# Patient Record
Sex: MALE | Race: WHITE | NOT HISPANIC OR LATINO | Employment: FULL TIME | URBAN - METROPOLITAN AREA
[De-identification: names, ages, dates, MRNs, and addresses within clinical notes are randomized per-mention and may not be internally consistent; named-entity substitution may affect disease eponyms.]

---

## 2019-08-14 DIAGNOSIS — Z00.00 ROUTINE GENERAL MEDICAL EXAMINATION AT A HEALTH CARE FACILITY: Primary | ICD-10-CM

## 2019-10-24 ENCOUNTER — CLINICAL SUPPORT (OUTPATIENT)
Dept: INTERNAL MEDICINE | Facility: CLINIC | Age: 34
End: 2019-10-24
Attending: INTERNAL MEDICINE
Payer: COMMERCIAL

## 2019-10-24 ENCOUNTER — OFFICE VISIT (OUTPATIENT)
Dept: INFECTIOUS DISEASES | Facility: CLINIC | Age: 34
End: 2019-10-24
Payer: COMMERCIAL

## 2019-10-24 ENCOUNTER — HOSPITAL ENCOUNTER (OUTPATIENT)
Dept: RADIOLOGY | Facility: HOSPITAL | Age: 34
Discharge: HOME | End: 2019-10-24
Attending: INTERNAL MEDICINE
Payer: COMMERCIAL

## 2019-10-24 ENCOUNTER — CLINICAL SUPPORT (OUTPATIENT)
Dept: INFECTIOUS DISEASES | Facility: CLINIC | Age: 34
End: 2019-10-24
Payer: COMMERCIAL

## 2019-10-24 ENCOUNTER — HOSPITAL ENCOUNTER (OUTPATIENT)
Dept: CARDIOLOGY | Facility: CLINIC | Age: 34
Discharge: HOME | End: 2019-10-24
Payer: COMMERCIAL

## 2019-10-24 VITALS
DIASTOLIC BLOOD PRESSURE: 76 MMHG | HEART RATE: 69 BPM | HEIGHT: 71 IN | WEIGHT: 199.94 LBS | SYSTOLIC BLOOD PRESSURE: 117 MMHG | TEMPERATURE: 98 F | BODY MASS INDEX: 27.99 KG/M2

## 2019-10-24 DIAGNOSIS — Z00.00 PREVENTATIVE HEALTH CARE: Primary | ICD-10-CM

## 2019-10-24 DIAGNOSIS — F40.240 CLAUSTROPHOBIA: ICD-10-CM

## 2019-10-24 DIAGNOSIS — Z00.00 ROUTINE GENERAL MEDICAL EXAMINATION AT A HEALTH CARE FACILITY: Primary | ICD-10-CM

## 2019-10-24 DIAGNOSIS — Z00.00 ROUTINE GENERAL MEDICAL EXAMINATION AT A HEALTH CARE FACILITY: ICD-10-CM

## 2019-10-24 DIAGNOSIS — E78.1 HYPERTRIGLYCERIDEMIA: ICD-10-CM

## 2019-10-24 DIAGNOSIS — Z00.00 PREVENTATIVE HEALTH CARE: ICD-10-CM

## 2019-10-24 LAB
ALBUMIN SERPL BCP-MCNC: 4.3 G/DL (ref 3.5–5.2)
ALP SERPL-CCNC: 81 U/L (ref 55–135)
ALT SERPL W/O P-5'-P-CCNC: 29 U/L (ref 10–44)
ANION GAP SERPL CALC-SCNC: 7 MMOL/L (ref 8–16)
AST SERPL-CCNC: 26 U/L (ref 10–40)
BILIRUB SERPL-MCNC: 0.6 MG/DL (ref 0.1–1)
BUN SERPL-MCNC: 17 MG/DL (ref 6–20)
CALCIUM SERPL-MCNC: 9.6 MG/DL (ref 8.7–10.5)
CHLORIDE SERPL-SCNC: 106 MMOL/L (ref 95–110)
CHOLEST SERPL-MCNC: 213 MG/DL (ref 120–199)
CHOLEST/HDLC SERPL: 7.3 {RATIO} (ref 2–5)
CO2 SERPL-SCNC: 30 MMOL/L (ref 23–29)
CREAT SERPL-MCNC: 1.1 MG/DL (ref 0.5–1.4)
ERYTHROCYTE [DISTWIDTH] IN BLOOD BY AUTOMATED COUNT: 12.3 % (ref 11.5–14.5)
EST. GFR  (AFRICAN AMERICAN): >60 ML/MIN/1.73 M^2
EST. GFR  (NON AFRICAN AMERICAN): >60 ML/MIN/1.73 M^2
ESTIMATED AVG GLUCOSE: 103 MG/DL (ref 68–131)
GLUCOSE SERPL-MCNC: 100 MG/DL (ref 70–110)
HBA1C MFR BLD HPLC: 5.2 % (ref 4–5.6)
HCT VFR BLD AUTO: 49.2 % (ref 40–54)
HDLC SERPL-MCNC: 29 MG/DL (ref 40–75)
HDLC SERPL: 13.6 % (ref 20–50)
HGB BLD-MCNC: 16.8 G/DL (ref 14–18)
HIV 1+2 AB+HIV1 P24 AG SERPL QL IA: NEGATIVE
LDLC SERPL CALC-MCNC: 137.6 MG/DL (ref 63–159)
MCH RBC QN AUTO: 30.7 PG (ref 27–31)
MCHC RBC AUTO-ENTMCNC: 34.1 G/DL (ref 32–36)
MCV RBC AUTO: 90 FL (ref 82–98)
NONHDLC SERPL-MCNC: 184 MG/DL
PLATELET # BLD AUTO: 257 K/UL (ref 150–350)
PMV BLD AUTO: 9.3 FL (ref 9.2–12.9)
POTASSIUM SERPL-SCNC: 4.6 MMOL/L (ref 3.5–5.1)
PROT SERPL-MCNC: 7.8 G/DL (ref 6–8.4)
RBC # BLD AUTO: 5.48 M/UL (ref 4.6–6.2)
SODIUM SERPL-SCNC: 143 MMOL/L (ref 136–145)
TRIGL SERPL-MCNC: 232 MG/DL (ref 30–150)
WBC # BLD AUTO: 8.12 K/UL (ref 3.9–12.7)

## 2019-10-24 PROCEDURE — 71046 XR CHEST PA AND LATERAL: ICD-10-PCS | Mod: 26,,, | Performed by: RADIOLOGY

## 2019-10-24 PROCEDURE — 85027 COMPLETE CBC AUTOMATED: CPT

## 2019-10-24 PROCEDURE — 93005 EKG 12-LEAD: ICD-10-PCS | Mod: ,,, | Performed by: INTERNAL MEDICINE

## 2019-10-24 PROCEDURE — 97802 PR MED NUTR THER, 1ST, INDIV, EA 15 MIN: ICD-10-PCS | Mod: S$GLB,,, | Performed by: INTERNAL MEDICINE

## 2019-10-24 PROCEDURE — 93010 EKG 12-LEAD: ICD-10-PCS | Mod: ,,, | Performed by: INTERNAL MEDICINE

## 2019-10-24 PROCEDURE — 36415 COLL VENOUS BLD VENIPUNCTURE: CPT

## 2019-10-24 PROCEDURE — 90471 FLU VACCINE (QUAD) GREATER THAN OR EQUAL TO 3YO PRESERVATIVE FREE IM: ICD-10-PCS | Mod: S$GLB,,, | Performed by: INTERNAL MEDICINE

## 2019-10-24 PROCEDURE — 86703 HIV-1/HIV-2 1 RESULT ANTBDY: CPT

## 2019-10-24 PROCEDURE — 90686 FLU VACCINE (QUAD) GREATER THAN OR EQUAL TO 3YO PRESERVATIVE FREE IM: ICD-10-PCS | Mod: S$GLB,,, | Performed by: INTERNAL MEDICINE

## 2019-10-24 PROCEDURE — 80053 COMPREHEN METABOLIC PANEL: CPT

## 2019-10-24 PROCEDURE — 80061 LIPID PANEL: CPT

## 2019-10-24 PROCEDURE — 99385 PREV VISIT NEW AGE 18-39: CPT | Mod: S$PBB,,, | Performed by: INTERNAL MEDICINE

## 2019-10-24 PROCEDURE — 99385 PR PREVENTIVE VISIT,NEW,18-39: ICD-10-PCS | Mod: S$PBB,,, | Performed by: INTERNAL MEDICINE

## 2019-10-24 PROCEDURE — 90686 IIV4 VACC NO PRSV 0.5 ML IM: CPT | Mod: S$GLB,,, | Performed by: INTERNAL MEDICINE

## 2019-10-24 PROCEDURE — 93010 ELECTROCARDIOGRAM REPORT: CPT | Mod: ,,, | Performed by: INTERNAL MEDICINE

## 2019-10-24 PROCEDURE — 83036 HEMOGLOBIN GLYCOSYLATED A1C: CPT

## 2019-10-24 PROCEDURE — 97802 MEDICAL NUTRITION INDIV IN: CPT | Mod: S$GLB,,, | Performed by: INTERNAL MEDICINE

## 2019-10-24 PROCEDURE — 93005 ELECTROCARDIOGRAM TRACING: CPT | Mod: ,,, | Performed by: INTERNAL MEDICINE

## 2019-10-24 PROCEDURE — 90471 IMMUNIZATION ADMIN: CPT | Mod: S$GLB,,, | Performed by: INTERNAL MEDICINE

## 2019-10-24 PROCEDURE — 71046 X-RAY EXAM CHEST 2 VIEWS: CPT | Mod: 26,,, | Performed by: RADIOLOGY

## 2019-10-24 PROCEDURE — 71046 X-RAY EXAM CHEST 2 VIEWS: CPT | Mod: TC,FY

## 2019-10-24 RX ORDER — LORATADINE 10 MG/1
10 TABLET ORAL DAILY
COMMUNITY

## 2019-10-24 RX ORDER — AZITHROMYCIN 250 MG/1
500 TABLET, FILM COATED ORAL DAILY
COMMUNITY
End: 2020-12-17

## 2019-10-24 RX ORDER — MONTELUKAST SODIUM 10 MG/1
10 TABLET ORAL NIGHTLY
COMMUNITY
End: 2024-02-01

## 2019-10-24 NOTE — PROGRESS NOTES
Subjective:      Patient ID: Daron Lea is a 34 y.o. male.    Chief Complaint:   Executive Health exam for this  of [facility] Taylor. He lives in [place] and is  with two daughters ages 4 and 15 months.       History of Present Illness  He has enjoyed good health in the past. He has been getting PT for a right shoulder injury and may be having surgery on this in near future. He is about 15 lb over his ideal body weight and does not get any regular exercise. Only complaint was of some claustraphobia which he requested some counseling for.    The laboratory studies were reviewed and discussed with the pt:  CBC, comprehensive metabolic profile, hemoglobin A1c, HIV antibody, EKG and chest xray were all normal.    Lipid profile shows a mild elevation of total cholesterol and triglycerides and a low HDL cholesterol  Lab Results   Component Value Date    CHOL 213 (H) 10/24/2019     Lab Results   Component Value Date    HDL 29 (L) 10/24/2019     Lab Results   Component Value Date    LDLCALC 137.6 10/24/2019     Lab Results   Component Value Date    TRIG 232 (H) 10/24/2019     Lab Results   Component Value Date    CHOLHDL 13.6 (L) 10/24/2019         Review of Systems   Constitution: Positive for malaise/fatigue. Negative for chills, decreased appetite, fever, night sweats, weight gain and weight loss.   HENT: Positive for sore throat. Negative for congestion, ear pain, hearing loss, hoarse voice and tinnitus.    Eyes: Negative for blurred vision, redness and visual disturbance.   Cardiovascular: Negative for chest pain, leg swelling and palpitations.   Respiratory: Negative for cough, hemoptysis, shortness of breath and sputum production.    Hematologic/Lymphatic: Negative for adenopathy. Does not bruise/bleed easily.   Skin: Negative for dry skin, itching, rash and suspicious lesions.   Musculoskeletal: Negative for back pain, joint pain, myalgias and neck pain.   Gastrointestinal: Positive for vomiting.  Negative for abdominal pain, constipation, diarrhea, heartburn and nausea.   Genitourinary: Negative for dysuria, flank pain, frequency, hematuria, hesitancy and urgency.   Neurological: Negative for dizziness, headaches, numbness, paresthesias and weakness.   Psychiatric/Behavioral: Negative for depression and memory loss. The patient does not have insomnia and is not nervous/anxious.      Objective:   Physical Exam   Constitutional: He is oriented to person, place, and time. He appears well-developed and well-nourished.   HENT:   Head: Normocephalic and atraumatic.   Right Ear: External ear normal.   Left Ear: External ear normal.   Mouth/Throat: Oropharynx is clear and moist.   Eyes: Pupils are equal, round, and reactive to light. Conjunctivae and EOM are normal.   Neck: Normal range of motion. Neck supple. No thyromegaly present.   Cardiovascular: Normal rate, regular rhythm and normal heart sounds.   No murmur heard.  Pulmonary/Chest: Effort normal and breath sounds normal. He has no wheezes. He has no rales.   Abdominal: Soft. Bowel sounds are normal. He exhibits no mass. There is no tenderness. There is no rebound.   Genitourinary:   Genitourinary Comments: Normal genitalia, no hernias present   Musculoskeletal: Normal range of motion.   Lymphadenopathy:     He has no cervical adenopathy.   Neurological: He is alert and oriented to person, place, and time.   Skin: Skin is warm and dry.   Psychiatric: He has a normal mood and affect. His behavior is normal.   Vitals reviewed.    Assessment:       1. Preventative health care    2. Claustrophobia    3. Hypertriglyceridemia     4.     Overweight by 15 lb     Plan:        1. Increase exercise level and decrease caloric intake to lose weight and improve lipid level   2. Psychology consult re claustraphobia    3. Flu shot

## 2019-10-24 NOTE — PROGRESS NOTES
"Nutrition Assessment  Client name:  Daron Lea  :  1985  Age:  34 y.o.  Gender:  male    Client states he is here for his first Executive Health medical examination. He lives in [place], [place] but will travel for work to [place]. 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 He currently admits to taking Claritin and a sinus medication for seasonal allergies. He also states he has soreness in his right shoulder from recent surgery, tore right shoulder labrum playing with his kids. He originally noticed the pain 2.5 years ago, started seeing a chiropractor, and then finally moved to seeing an orthopedic surgeon. Mr. Lea is a father of two: 4 year old and a 16 month old. 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 Other two days of the week sitting in a desk all day. Not a big breakfast rui. Typically will either skip breakfast or eat a bag of trail mix around 9:30am. Wife likes to cook healthy meals at home. He has a personal goal of 10, 000 steps a day and walks the dog every night. Does not like to run. This past  (10/20/2019) he threw up for 3 hours, but attributes the reason to daughter having had similar symptoms three days prior. Stayed home Monday and felt back to normal on Tuesday. He feels he may be allergic to crab/crawfish boil, which typically gives him soon after. His states his UBW is 199 pounds.    Anthropometrics  Height:  71 inches     Weight:  199 pounds  BMI:  27.8  % Body Fat:  N/A    Clinical Signs/Symptoms  N/V/D:  Vomitting last  for 3 hours r/t daughter had similar symptoms three days before. Feeling well today.  Appetite (Good, Fair, or Poor):  good      No past medical history on file.    No past surgical history on file.    Medications    Singulair & Claritin.    Vitamins, Minerals, and/or Supplements:  Green Vitamin (from Wife) "Plexus X-Factor"    Food/Medication Interactions:  Reviewed "     Food Allergies or Intolerances:  NKFA     Social History    Marital status:    Employment:  [employer]    Social History     Tobacco Use    Smoking status: Not on file   Substance Use Topics    Alcohol use: Not on file        Lab Reports   Total Cholesterol:  213    Triglycerides:  232  HDL:  29  LDL:  137.6   Glucose:  100  HbA1c:  5.2  BP:  117/76     Food History  Breakfast:  None / Trail Mix / Waffle / Strawberries and Cream Oatmeal + 2 cups of coffee w 1 tsp honey  Mid-morning Snack:  None  water  Lunch:  Restaurant foods (sandwich/ bread-jorge luis meals/ burgers/ wraps w fries / chips) + sweet tea  Mid-afternoon Snack:  Water  Dinner:  Red Beans and Rice / Lasagna / Steaks on the weekends / Kabobs / Mexican / Sushi / [employer] + Sweet Tea  H.S. Snack:  Late night bowl of cereal (Honey Bunches of Oats / Cinnamon Toast Crunch / Yong's Puff) 4 nights a week  *Fluid intake:  Coffee, Sweet Tea, Water, 3 drinks alcohol a week    Exercise History:  Mildly Active. Inconsistent 10,000 steps a day goal.    Cultural/Spiritual/Personal Preferences:  none    Support System:  Wife and Kids at home    State of Change:  contemplation    Barriers to Change:  none    Diagnosis    Altered nutrition-related lab values related to high consumption of carbohydrates and inactive lifestyle as evidenced by Trig 232, HDL 29.    Intervention    RMR (Method:  [place] Jeor):  1868 kcal  Activity Factor:  1.2  MANA:  2250  Protein Needs: (1.1g/kg) 100 g    Goals:  1.  Decrease intake of carbohydrates to ~60 g carbs / meal  2.  Increase physical activity to 10, 000 steps 6 days a week consistently  3.  Cut down Sweat Tea to half of current fl oz. intake    Nutrition Education  Educated patient on the importance of appropriate carbohydrates for energy. Recommended he limit his intake of simple sugars and focus on complex carbohydrates. Encouraged him to increase consistency of his physical activity for lipid panel improvements.  Gave him clear guidelines and suggestions for approaching commercially available foods and foods prepared outside of the home.  Patient verbalized understanding of nutrition education and recommendations received.    Handouts Provided  Meal Planning Guide  Restaurant Guide  Eat Fit Shopping List  Eat Fit Emily  Fast Food Guide  Vitamin/Mineral Guide    Monitoring/Evaluation    Monitor the following:  Weight  BMI  % Body Fat  Caloric intake  Labs:  LDL, Trig, TC    Follow Up Plan:  Communication with referring healthcare provider is unnecessary at this time as patient presented as part of annual wellness exam.  However, will follow up with patient in 1-2 years.

## 2020-01-10 ENCOUNTER — PATIENT MESSAGE (OUTPATIENT)
Dept: INFECTIOUS DISEASES | Facility: CLINIC | Age: 35
End: 2020-01-10

## 2020-01-21 ENCOUNTER — PATIENT MESSAGE (OUTPATIENT)
Dept: INFECTIOUS DISEASES | Facility: CLINIC | Age: 35
End: 2020-01-21

## 2020-12-17 ENCOUNTER — CLINICAL SUPPORT (OUTPATIENT)
Dept: INTERNAL MEDICINE | Facility: CLINIC | Age: 35
End: 2020-12-17

## 2020-12-17 ENCOUNTER — OFFICE VISIT (OUTPATIENT)
Dept: INTERNAL MEDICINE | Facility: CLINIC | Age: 35
End: 2020-12-17
Payer: COMMERCIAL

## 2020-12-17 ENCOUNTER — HOSPITAL ENCOUNTER (OUTPATIENT)
Dept: RADIOLOGY | Facility: HOSPITAL | Age: 35
Discharge: HOME | End: 2020-12-17
Attending: INTERNAL MEDICINE

## 2020-12-17 ENCOUNTER — CLINICAL SUPPORT (OUTPATIENT)
Dept: INTERNAL MEDICINE | Facility: CLINIC | Age: 35
End: 2020-12-17
Payer: COMMERCIAL

## 2020-12-17 ENCOUNTER — HOSPITAL ENCOUNTER (OUTPATIENT)
Dept: CARDIOLOGY | Facility: CLINIC | Age: 35
Discharge: HOME | End: 2020-12-17

## 2020-12-17 VITALS
WEIGHT: 207.5 LBS | BODY MASS INDEX: 29.05 KG/M2 | SYSTOLIC BLOOD PRESSURE: 109 MMHG | DIASTOLIC BLOOD PRESSURE: 75 MMHG | TEMPERATURE: 98 F | HEART RATE: 77 BPM | HEIGHT: 71 IN

## 2020-12-17 DIAGNOSIS — Z00.00 ROUTINE GENERAL MEDICAL EXAMINATION AT A HEALTH CARE FACILITY: ICD-10-CM

## 2020-12-17 DIAGNOSIS — Z00.00 ROUTINE GENERAL MEDICAL EXAMINATION AT A HEALTH CARE FACILITY: Primary | ICD-10-CM

## 2020-12-17 DIAGNOSIS — Z00.00 ANNUAL PHYSICAL EXAM: Primary | ICD-10-CM

## 2020-12-17 DIAGNOSIS — Z00.00 ENCOUNTER FOR ANNUAL HEALTH EXAMINATION: Primary | ICD-10-CM

## 2020-12-17 LAB
ALBUMIN SERPL BCP-MCNC: 4.1 G/DL (ref 3.5–5.2)
ALP SERPL-CCNC: 83 U/L (ref 55–135)
ALT SERPL W/O P-5'-P-CCNC: 26 U/L (ref 10–44)
ANION GAP SERPL CALC-SCNC: 7 MMOL/L (ref 8–16)
AST SERPL-CCNC: 15 U/L (ref 10–40)
BILIRUB SERPL-MCNC: 0.9 MG/DL (ref 0.1–1)
BUN SERPL-MCNC: 18 MG/DL (ref 6–20)
CALCIUM SERPL-MCNC: 9.1 MG/DL (ref 8.7–10.5)
CHLORIDE SERPL-SCNC: 104 MMOL/L (ref 95–110)
CHOLEST SERPL-MCNC: 227 MG/DL (ref 120–199)
CHOLEST/HDLC SERPL: 7.1 {RATIO} (ref 2–5)
CO2 SERPL-SCNC: 29 MMOL/L (ref 23–29)
CREAT SERPL-MCNC: 1 MG/DL (ref 0.5–1.4)
ERYTHROCYTE [DISTWIDTH] IN BLOOD BY AUTOMATED COUNT: 12.3 % (ref 11.5–14.5)
EST. GFR  (AFRICAN AMERICAN): >60 ML/MIN/1.73 M^2
EST. GFR  (NON AFRICAN AMERICAN): >60 ML/MIN/1.73 M^2
ESTIMATED AVG GLUCOSE: 111 MG/DL (ref 68–131)
GLUCOSE SERPL-MCNC: 100 MG/DL (ref 70–110)
HBA1C MFR BLD HPLC: 5.5 % (ref 4–5.6)
HCT VFR BLD AUTO: 47 % (ref 40–54)
HCV AB SERPL QL IA: NEGATIVE
HDLC SERPL-MCNC: 32 MG/DL (ref 40–75)
HDLC SERPL: 14.1 % (ref 20–50)
HGB BLD-MCNC: 15.8 G/DL (ref 14–18)
LDLC SERPL CALC-MCNC: 151 MG/DL (ref 63–159)
MCH RBC QN AUTO: 30 PG (ref 27–31)
MCHC RBC AUTO-ENTMCNC: 33.6 G/DL (ref 32–36)
MCV RBC AUTO: 89 FL (ref 82–98)
NONHDLC SERPL-MCNC: 195 MG/DL
PLATELET # BLD AUTO: 243 K/UL (ref 150–350)
PMV BLD AUTO: 9.4 FL (ref 9.2–12.9)
POTASSIUM SERPL-SCNC: 4 MMOL/L (ref 3.5–5.1)
PROT SERPL-MCNC: 7.7 G/DL (ref 6–8.4)
RBC # BLD AUTO: 5.26 M/UL (ref 4.6–6.2)
SARS-COV-2 IGG SERPLBLD QL IA.RAPID: NEGATIVE
SODIUM SERPL-SCNC: 140 MMOL/L (ref 136–145)
TRIGL SERPL-MCNC: 220 MG/DL (ref 30–150)
WBC # BLD AUTO: 6.69 K/UL (ref 3.9–12.7)

## 2020-12-17 PROCEDURE — 99999 PR PBB SHADOW E&M-EST. PATIENT-LVL III: CPT | Mod: PBBFAC,,, | Performed by: INTERNAL MEDICINE

## 2020-12-17 PROCEDURE — 99395 PREV VISIT EST AGE 18-39: CPT | Mod: S$GLB,,, | Performed by: INTERNAL MEDICINE

## 2020-12-17 PROCEDURE — 80061 LIPID PANEL: CPT

## 2020-12-17 PROCEDURE — 93005 EKG 12-LEAD: ICD-10-PCS | Mod: S$GLB,,, | Performed by: INTERNAL MEDICINE

## 2020-12-17 PROCEDURE — 86769 SARS-COV-2 COVID-19 ANTIBODY: CPT

## 2020-12-17 PROCEDURE — 99999 PR PBB SHADOW E&M-EST. PATIENT-LVL I: CPT | Mod: PBBFAC,,,

## 2020-12-17 PROCEDURE — 1126F PR PAIN SEVERITY QUANTIFIED, NO PAIN PRESENT: ICD-10-PCS | Mod: S$GLB,,, | Performed by: INTERNAL MEDICINE

## 2020-12-17 PROCEDURE — 97802 PR MED NUTR THER, 1ST, INDIV, EA 15 MIN: ICD-10-PCS | Mod: S$GLB,,, | Performed by: INTERNAL MEDICINE

## 2020-12-17 PROCEDURE — 93005 ELECTROCARDIOGRAM TRACING: CPT | Mod: S$GLB,,, | Performed by: INTERNAL MEDICINE

## 2020-12-17 PROCEDURE — 93010 EKG 12-LEAD: ICD-10-PCS | Mod: S$GLB,,, | Performed by: INTERNAL MEDICINE

## 2020-12-17 PROCEDURE — 99395 PR PREVENTIVE VISIT,EST,18-39: ICD-10-PCS | Mod: S$GLB,,, | Performed by: INTERNAL MEDICINE

## 2020-12-17 PROCEDURE — 71046 XR CHEST PA AND LATERAL: ICD-10-PCS | Mod: 26,,, | Performed by: RADIOLOGY

## 2020-12-17 PROCEDURE — 71046 X-RAY EXAM CHEST 2 VIEWS: CPT | Mod: TC,FY

## 2020-12-17 PROCEDURE — 99999 PR PBB SHADOW E&M-EST. PATIENT-LVL I: ICD-10-PCS | Mod: PBBFAC,,,

## 2020-12-17 PROCEDURE — 3008F PR BODY MASS INDEX (BMI) DOCUMENTED: ICD-10-PCS | Mod: CPTII,S$GLB,, | Performed by: INTERNAL MEDICINE

## 2020-12-17 PROCEDURE — 97802 MEDICAL NUTRITION INDIV IN: CPT | Mod: S$GLB,,, | Performed by: INTERNAL MEDICINE

## 2020-12-17 PROCEDURE — 93010 ELECTROCARDIOGRAM REPORT: CPT | Mod: S$GLB,,, | Performed by: INTERNAL MEDICINE

## 2020-12-17 PROCEDURE — 99999 PR PBB SHADOW E&M-EST. PATIENT-LVL III: ICD-10-PCS | Mod: PBBFAC,,, | Performed by: INTERNAL MEDICINE

## 2020-12-17 PROCEDURE — 85027 COMPLETE CBC AUTOMATED: CPT

## 2020-12-17 PROCEDURE — 80053 COMPREHEN METABOLIC PANEL: CPT

## 2020-12-17 PROCEDURE — 71046 X-RAY EXAM CHEST 2 VIEWS: CPT | Mod: 26,,, | Performed by: RADIOLOGY

## 2020-12-17 PROCEDURE — 36415 COLL VENOUS BLD VENIPUNCTURE: CPT

## 2020-12-17 PROCEDURE — 1126F AMNT PAIN NOTED NONE PRSNT: CPT | Mod: S$GLB,,, | Performed by: INTERNAL MEDICINE

## 2020-12-17 PROCEDURE — 83036 HEMOGLOBIN GLYCOSYLATED A1C: CPT

## 2020-12-17 PROCEDURE — 97802 MEDICAL NUTRITION INDIV IN: CPT | Mod: PBBFAC | Performed by: INTERNAL MEDICINE

## 2020-12-17 PROCEDURE — 3008F BODY MASS INDEX DOCD: CPT | Mod: CPTII,S$GLB,, | Performed by: INTERNAL MEDICINE

## 2020-12-17 PROCEDURE — 86803 HEPATITIS C AB TEST: CPT

## 2020-12-17 RX ORDER — FLUTICASONE PROPIONATE 50 MCG
1 SPRAY, SUSPENSION (ML) NASAL 2 TIMES DAILY
Refills: 0
Start: 2020-12-17 | End: 2022-01-07 | Stop reason: SDUPTHER

## 2020-12-17 NOTE — LETTER
12/17/2020    Daron Lea  [address] [address]       [facility]  [address], [address]  [facility] [address]  Phone: [phone]  Fax: [phone] Dear Mr. Lea:    Thank you for allowing me to serve you and perform your Executive Health exam on 12/17/2020.  This letter will serve a brief summary of the history, findings and discussions at that time. I have included the lab and study results for you to have available at future healthcare appointments.      Reason for Visit: Executive Health Preventive Physical Examination      Past Medical History:  Past Medical History:   Diagnosis Date    Allergic rhinitis     Hyperlipidemia         Labs:  Comprehensive metabolic panel    Collection Time: 12/17/20  7:33 AM   Result Value Ref Range    Sodium 140 136 - 145 mmol/L    Potassium 4.0 3.5 - 5.1 mmol/L    Chloride 104 95 - 110 mmol/L    CO2 29 23 - 29 mmol/L    Glucose 100 70 - 110 mg/dL    BUN 18 6 - 20 mg/dL    Creatinine 1.0 0.5 - 1.4 mg/dL    Calcium 9.1 8.7 - 10.5 mg/dL    Total Protein 7.7 6.0 - 8.4 g/dL    Albumin 4.1 3.5 - 5.2 g/dL    Total Bilirubin 0.9 0.1 - 1.0 mg/dL    Alkaline Phosphatase 83 55 - 135 U/L    AST 15 10 - 40 U/L    ALT 26 10 - 44 U/L    Anion Gap 7 (L) 8 - 16 mmol/L    eGFR if African American >60.0 >60 mL/min/1.73 m^2    eGFR if non African American >60.0 >60 mL/min/1.73 m^2   CBC Without Differential    Collection Time: 12/17/20  7:33 AM   Result Value Ref Range    WBC 6.69 3.90 - 12.70 K/uL    RBC 5.26 4.60 - 6.20 M/uL    Hemoglobin 15.8 14.0 - 18.0 g/dL    Hematocrit 47.0 40.0 - 54.0 %    MCV 89 82 - 98 fL    MCH 30.0 27.0 - 31.0 pg    MCHC 33.6 32.0 - 36.0 g/dL    RDW 12.3 11.5 - 14.5 %    Platelets 243 150 - 350 K/uL    MPV 9.4 9.2 - 12.9 fL   Lipid panel    Collection Time: 12/17/20  7:33 AM   Result Value Ref Range    Cholesterol 227 (H) 120 - 199 mg/dL    Triglycerides 220 (H) 30 - 150 mg/dL     HDL 32 (L) 40 - 75 mg/dL    LDL Cholesterol 151.0 63.0 - 159.0 mg/dL    HDL/Cholesterol Ratio 14.1 (L) 20.0 - 50.0 %    Total Cholesterol/HDL Ratio 7.1 (H) 2.0 - 5.0    Non-HDL Cholesterol 195 mg/dL   Hemoglobin A1c    Collection Time: 12/17/20  7:33 AM   Result Value Ref Range    Hemoglobin A1C 5.5 4.0 - 5.6 %    Estimated Avg Glucose 111 68 - 131 mg/dL   Hepatitis C Antibody    Collection Time: 12/17/20  7:33 AM   Result Value Ref Range    Hepatitis C Ab Negative Negative   COVID-19 (SARS CoV-2) IgG Antibody    Collection Time: 12/17/20  7:33 AM   Result Value Ref Range    Antibody SARS CoV-2 Negative Negative       EKG: Normal sinus rhythm    Chest Xray: Clear lungs, normal heart size.      Assessment/Recommendations:    Health Maintenance and Prevention:  - Flu vaccine received this season. Repeat yearly.  - Tetanus booster (Tdap) up to date (2015). Repeat every 10 years.    - HIV screen was negative 2019.  - Hepatitis C screen is negative.    - Blood pressure is normal.  - Blood sugars are normal but may be trending up slightly to high normal levels. Focus on weight and diet with moderation of sugars and carbs. Repeat blood sugar check 1 year.       High cholesterol - Focus on weight and diet with moderation of sugars, carbs and saturated fats. Repeat cholesterol levels in 6 to 12 months.     Allergic rhinitis - Lingering cough after sinusitis flair 1 month ago. Normal chest Xray at our visit. Continue Flonase spray, Singulair and Claritin as needed. Consider trying nightly Pepcid if cough remains persistent. Acid reflux can commonly cause a cough even without the typical symptoms of heartburn.      It was a pleasure meeting you for your health exam, Mr. Lea.    If you have any questions or concerns, please don't hesitate to call.    Sincerely,    Siva Weber MD

## 2020-12-17 NOTE — PROGRESS NOTES
"Nutrition Assessment  Client name:  Daron Lea   (Annual  physical)  :  1985  Age:  35 y.o.  Gender:  male    Client states:  He takes digestive enzymes, as since his childhood, he has been a funnel and some foods like seafood cooked in seasoned water and homemade ice cream promote multiple bouts of diarrhea. He subs almond milk and avoids boiled seafood most of the time. Had OR last November on rt.shoulder labral and recovered well. Last year his HDL was tool low and triglycerides elevated therefore he has reduced intake of sweetened tea and is now drinking more water. Has a sweet tooth and this time of the year there are cookies and brownies in the house of which he partakes with children. Mentions his wife is health conscious and is a great cook. He describes his Whoop band with great excitement as it allows him to compete with his friends as it tracks Heart rate variable and day strain recovery rate. Reveals that he burns 2100 calories daily. Has gained 5# since epidemic and is conscious about carbohydrates, however says does not do a great job. His goals by next visit are to increase his exercise frequency, eat more veggies and lose wt.    Anthropometrics  Height: 5'11"*     Weight:  207#  BMI:  28.94  % Body Fat:  unknown    Clinical Signs/Symptoms  N/V/D:  Diarrhea with consumption of boiled seafood and homemade ice cream   Appetite:  good       Past Medical History:   Diagnosis Date    Allergic rhinitis     Hyperlipidemia        Past Surgical History:   Procedure Laterality Date    ANTERIOR CRUCIATE LIGAMENT REPAIR Left     SHOULDER ARTHROSCOPY W/ LABRAL REPAIR Right        Medications    has a current medication list which includes the following prescription(s): fluticasone propionate, loratadine, and montelukast.    Vitamins, Minerals, and/or Supplements:  MVI, Vit C, Digestive enzymes, omega 3, zinc     Food/Medication Interactions:  Reviewed     Food Allergies or Intolerances:  " Intolerances to homemade ice cream and boiled seafood - causes explosive diarrhea     Social History    Marital status:  Single  Employment:  [employer]     Social History     Tobacco Use    Smoking status: Never Smoker    Smokeless tobacco: Never Used   Substance Use Topics    Alcohol use: Yes     Comment: Occasional - 2 beers per week        Lab Reports   Total Cholesterol:  227    Triglycerides:  220  HDL:  32  LDL:  151   Glucose:  100  HbA1c:  5.5  BP Readings from Last 1 Encounters:   20 109/75       Food History  Breakfast:  Coffee with honey  Coffee with honey  Mid-morning Snack:  2x/wk trail mix with M&M's  Lunch:  BBQ brisket, pork and beans, mac and cheese, glass sweet tea  Mid-afternoon Snack:  none   Dinner:  Red beans and rice, sausage, salad, water  H.S. Snack:  Cookies or brownies on weekend  *Fluid intake:  Coffee, tea, water, ETOH    Exercise History:  Walks dog 30 minutes 3x/wk, 2x/wk [place] 30 minutes 1x/wk, 40 minutes resistance training    Cultural/Spiritual/Personal Preferences:  None identified    Support System:  spouse    State of Change:  Preparation    Barriers to Change:  Enjoys sweets and sweetened tea    Diagnosis    Altered nutrition related laboratory values related to improper food choices and lack of adequate physical activity as evidenced by Chol: 227, Tri, LDL: 151.    Intervention    RMR (Method:  [address] NXEor):  1898 kcal  Activity Factor:  1.4  MANA:  2657 - 500 = 2157    Goals:  1.  [place] 3x/wk for 30 minutes  2.  Increase whole grains by 50%  3.  Trial of Splenda when dining out  4.  1/2 plate vegetables 2x/day  5   Goal wt: 197#    Nutrition Education  Reviewed and explained laboratory values and complimented client on less tea and more water intake and improvement in Triglyceride value. Explained the benefits of increasing exercise for wt loss and improvements in lipids. Discussed the advantages of whole grains to lower HAIC value and  food that lower and increase this number. Provided brand names of bean pasta and instant whole grain brown rice. Explained the difference of honey, sugar and Splenda and client agrees to try in tea, when dining out. Discussed the benefits of vegetable intake and amount daily. Suggested consuming breakfast daily and provided other brands of protein bars and to eliminate the chocolate in the trail mix. Reviewed the Fast food and Lite Restaurant dining guides for healthier choices when selecting entrees in restaurants.     Patient verbalized understanding of nutrition education and recommendations received.    Handouts Provided  Meal Planning Guide  Restaurant Guide  Eat Fit Shopping List  Eat Fit Emily  Fast Food Guide  Vitamin/Mineral Guide    Monitoring/Evaluation    Monitor the following:  Weight  BMI  % Body Fat  Caloric intake  Labs:  Lipids/HAIC    Follow Up Plan:  Communication with referring healthcare provider is unnecessary at this time as patient presented as part of annual wellness exam.  However, will follow up with patient in 1-2 years.

## 2020-12-17 NOTE — PROGRESS NOTES
Subjective:       Patient ID: Daron Lea is a 35 y.o. male.    Chief Complaint: Annual Exam      HPI:  Here for annual health exam.   Hx of allergic rhinitis. On Claritin and Singulair daily for years now. Sinusitis about a month ago. Has had a lingering cough since. Using Flonase bid. No reflux. No sob or cp. No hx of asthma. No smoking.  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 Main potential issues with regard to diet would be red meat, bread, potatoes, sweets (kids at home).     Review of Systems   Constitutional: Negative for fatigue, fever and unexpected weight change.   HENT: Negative for hearing loss and sinus pain.    Eyes: Negative for visual disturbance.   Respiratory: Positive for cough. Negative for chest tightness, shortness of breath and wheezing.    Cardiovascular: Negative for chest pain and leg swelling.   Gastrointestinal: Negative for abdominal pain, constipation, diarrhea, nausea and vomiting.   Genitourinary: Negative for difficulty urinating, dysuria and frequency.   Musculoskeletal: Negative for arthralgias and joint swelling.   Skin: Negative for rash and wound.   Neurological: Negative for dizziness, weakness and headaches.   Psychiatric/Behavioral: Negative for dysphoric mood. The patient is not nervous/anxious.        Past Medical History:   Diagnosis Date    Allergic rhinitis          Current Outpatient Medications:     loratadine (CLARITIN) 10 mg tablet, Take 10 mg by mouth once daily., Disp: , Rfl:     montelukast (SINGULAIR) 10 mg tablet, Take 10 mg by mouth every evening., Disp: , Rfl:     fluticasone propionate (FLONASE) 50 mcg/actuation nasal spray, 1 spray (50 mcg total) by Each Nostril route 2 (two) times daily., Disp: , Rfl: 0    Past Surgical History:   Procedure Laterality Date    ANTERIOR CRUCIATE LIGAMENT REPAIR Left     SHOULDER ARTHROSCOPY W/ LABRAL REPAIR Right        Family History    Problem Relation Age of Onset    Breast cancer Mother     Colon cancer Neg Hx        Social History     Tobacco Use    Smoking status: Never Smoker    Smokeless tobacco: Never Used   Substance Use Topics    Alcohol use: Yes     Comment: Occasional    Drug use: Never         Objective:      Vitals:    12/17/20 0946   BP: 109/75   Pulse: 77   Temp: 98.1 °F (36.7 °C)       Physical Exam  Constitutional:       General: He is not in acute distress.     Appearance: Normal appearance. He is well-developed. He is not ill-appearing.   HENT:      Head: Normocephalic and atraumatic.      Right Ear: Hearing and tympanic membrane normal. There is no impacted cerumen.      Left Ear: Hearing and tympanic membrane normal. There is no impacted cerumen.   Eyes:      Extraocular Movements: Extraocular movements intact.      Conjunctiva/sclera: Conjunctivae normal.   Cardiovascular:      Rate and Rhythm: Normal rate and regular rhythm.      Heart sounds: Normal heart sounds. No murmur.   Pulmonary:      Effort: Pulmonary effort is normal. No respiratory distress.      Breath sounds: Normal breath sounds.   Abdominal:      General: Abdomen is flat. There is no distension.   Musculoskeletal:         General: No swelling or deformity.      Right lower leg: No edema.      Left lower leg: No edema.   Skin:     General: Skin is warm and dry.      Findings: No lesion or rash.   Neurological:      General: No focal deficit present.      Mental Status: He is alert and oriented to person, place, and time.      Cranial Nerves: No cranial nerve deficit.      Coordination: Coordination normal.      Gait: Gait normal.      Deep Tendon Reflexes: Reflexes normal.   Psychiatric:         Mood and Affect: Mood normal.         Behavior: Behavior normal.         Thought Content: Thought content normal.         Judgment: Judgment normal.         Recent Results (from the past 2016 hour(s))   Comprehensive metabolic panel    Collection Time: 12/17/20   7:33 AM   Result Value Ref Range    Sodium 140 136 - 145 mmol/L    Potassium 4.0 3.5 - 5.1 mmol/L    Chloride 104 95 - 110 mmol/L    CO2 29 23 - 29 mmol/L    Glucose 100 70 - 110 mg/dL    BUN 18 6 - 20 mg/dL    Creatinine 1.0 0.5 - 1.4 mg/dL    Calcium 9.1 8.7 - 10.5 mg/dL    Total Protein 7.7 6.0 - 8.4 g/dL    Albumin 4.1 3.5 - 5.2 g/dL    Total Bilirubin 0.9 0.1 - 1.0 mg/dL    Alkaline Phosphatase 83 55 - 135 U/L    AST 15 10 - 40 U/L    ALT 26 10 - 44 U/L    Anion Gap 7 (L) 8 - 16 mmol/L    eGFR if African American >60.0 >60 mL/min/1.73 m^2    eGFR if non African American >60.0 >60 mL/min/1.73 m^2   CBC Without Differential    Collection Time: 12/17/20  7:33 AM   Result Value Ref Range    WBC 6.69 3.90 - 12.70 K/uL    RBC 5.26 4.60 - 6.20 M/uL    Hemoglobin 15.8 14.0 - 18.0 g/dL    Hematocrit 47.0 40.0 - 54.0 %    MCV 89 82 - 98 fL    MCH 30.0 27.0 - 31.0 pg    MCHC 33.6 32.0 - 36.0 g/dL    RDW 12.3 11.5 - 14.5 %    Platelets 243 150 - 350 K/uL    MPV 9.4 9.2 - 12.9 fL   Lipid panel    Collection Time: 12/17/20  7:33 AM   Result Value Ref Range    Cholesterol 227 (H) 120 - 199 mg/dL    Triglycerides 220 (H) 30 - 150 mg/dL    HDL 32 (L) 40 - 75 mg/dL    LDL Cholesterol 151.0 63.0 - 159.0 mg/dL    HDL/Cholesterol Ratio 14.1 (L) 20.0 - 50.0 %    Total Cholesterol/HDL Ratio 7.1 (H) 2.0 - 5.0    Non-HDL Cholesterol 195 mg/dL   Hemoglobin A1c    Collection Time: 12/17/20  7:33 AM   Result Value Ref Range    Hemoglobin A1C 5.5 4.0 - 5.6 %    Estimated Avg Glucose 111 68 - 131 mg/dL   COVID-19 (SARS CoV-2) IgG Antibody    Collection Time: 12/17/20  7:33 AM   Result Value Ref Range    Antibody SARS CoV-2 Negative Negative          EKG: NSR  CXR: Clear lungs, normal heart size    Assessment/Plan:     1) Health Maintenance and Prevention:  - Flu vaccine received this season.  - Tdap 2015  - HIV neg 2019  - Hep C screen pending.  - BP wnl.  - A1c wnl but slight increase to 5.5% in setting of increased weight, sedentary work,  and fluctuating diet. Discussed weight and dietary focus with moderation of sugars and carbs. Repeat a1c 1 yr.    2) HLD - Mildly elevated TG's and LDL. Discussed weight and dietary focus with moderation of sugars, carbs and saturated fats. Repeat lipids in 6 to 12 months.    3) Allergic rhinitis - Lingering cough after sinusitis flair 1 month ago. Normal CXR today. Continuing Flonase spray bid, Singulair and Claritin. Discussed considering Pepcid if cough remains persistent and any concern for possible reflux.

## 2020-12-23 ENCOUNTER — PATIENT MESSAGE (OUTPATIENT)
Dept: INTERNAL MEDICINE | Facility: CLINIC | Age: 35
End: 2020-12-23

## 2021-04-14 ENCOUNTER — PATIENT MESSAGE (OUTPATIENT)
Dept: INTERNAL MEDICINE | Facility: CLINIC | Age: 36
End: 2021-04-14

## 2021-05-10 ENCOUNTER — PATIENT MESSAGE (OUTPATIENT)
Dept: RESEARCH | Facility: HOSPITAL | Age: 36
End: 2021-05-10

## 2021-08-12 ENCOUNTER — PATIENT MESSAGE (OUTPATIENT)
Dept: INTERNAL MEDICINE | Facility: CLINIC | Age: 36
End: 2021-08-12

## 2021-08-17 ENCOUNTER — PATIENT MESSAGE (OUTPATIENT)
Dept: INTERNAL MEDICINE | Facility: CLINIC | Age: 36
End: 2021-08-17

## 2022-01-07 ENCOUNTER — OFFICE VISIT (OUTPATIENT)
Dept: OTOLARYNGOLOGY | Facility: CLINIC | Age: 37
End: 2022-01-07
Payer: COMMERCIAL

## 2022-01-07 ENCOUNTER — CLINICAL SUPPORT (OUTPATIENT)
Dept: AUDIOLOGY | Facility: CLINIC | Age: 37
End: 2022-01-07
Payer: COMMERCIAL

## 2022-01-07 VITALS — BODY MASS INDEX: 29.36 KG/M2 | WEIGHT: 210.56 LBS

## 2022-01-07 DIAGNOSIS — H93.8X9 SENSATION OF FULLNESS IN EAR, UNSPECIFIED LATERALITY: Primary | ICD-10-CM

## 2022-01-07 DIAGNOSIS — H69.93 ETD (EUSTACHIAN TUBE DYSFUNCTION), BILATERAL: Primary | ICD-10-CM

## 2022-01-07 PROCEDURE — 3008F PR BODY MASS INDEX (BMI) DOCUMENTED: ICD-10-PCS | Mod: CPTII,S$GLB,, | Performed by: NURSE PRACTITIONER

## 2022-01-07 PROCEDURE — 99999 PR PBB SHADOW E&M-EST. PATIENT-LVL III: ICD-10-PCS | Mod: PBBFAC,,, | Performed by: NURSE PRACTITIONER

## 2022-01-07 PROCEDURE — 92567 PR TYMPA2METRY: ICD-10-PCS | Mod: S$GLB,,, | Performed by: AUDIOLOGIST

## 2022-01-07 PROCEDURE — 1159F MED LIST DOCD IN RCRD: CPT | Mod: CPTII,S$GLB,, | Performed by: NURSE PRACTITIONER

## 2022-01-07 PROCEDURE — 3008F BODY MASS INDEX DOCD: CPT | Mod: CPTII,S$GLB,, | Performed by: NURSE PRACTITIONER

## 2022-01-07 PROCEDURE — 92567 TYMPANOMETRY: CPT | Mod: S$GLB,,, | Performed by: AUDIOLOGIST

## 2022-01-07 PROCEDURE — 99203 OFFICE O/P NEW LOW 30 MIN: CPT | Mod: S$GLB,,, | Performed by: NURSE PRACTITIONER

## 2022-01-07 PROCEDURE — 99999 PR PBB SHADOW E&M-EST. PATIENT-LVL III: CPT | Mod: PBBFAC,,, | Performed by: NURSE PRACTITIONER

## 2022-01-07 PROCEDURE — 99203 PR OFFICE/OUTPT VISIT, NEW, LEVL III, 30-44 MIN: ICD-10-PCS | Mod: S$GLB,,, | Performed by: NURSE PRACTITIONER

## 2022-01-07 PROCEDURE — 1159F PR MEDICATION LIST DOCUMENTED IN MEDICAL RECORD: ICD-10-PCS | Mod: CPTII,S$GLB,, | Performed by: NURSE PRACTITIONER

## 2022-01-07 RX ORDER — FLUTICASONE PROPIONATE 50 MCG
1 SPRAY, SUSPENSION (ML) NASAL 2 TIMES DAILY
Qty: 16 G | Refills: 12
Start: 2022-01-07 | End: 2023-01-07

## 2022-01-07 RX ORDER — AZELASTINE 1 MG/ML
1 SPRAY, METERED NASAL 2 TIMES DAILY
Qty: 30 ML | Refills: 12 | Status: SHIPPED | OUTPATIENT
Start: 2022-01-07 | End: 2022-02-03

## 2022-01-07 NOTE — PROGRESS NOTES
Tympanometry completed per order from Michelle Rubio NP.    Type C tympanogram with reduced compliance (.20) was obtained AS.  Type A tympanogram with slight negative pressure (-151) was obtained AD.    Medical evaluation recommended.  Patient referred back to Michelle Rubio NP for follow-up evaluation.

## 2022-01-07 NOTE — PATIENT INSTRUCTIONS
"Normal middle ear pressure is -150 to +50.   Your right middle ear pressure is -151, borderline abnormal.  Your left middle ear pressure is -286, abnormally low. This causes a retraction of the ear drum (which is the opposite of a bulging ear drum).     EUSTACHIAN TUBE INSTRUCTIONS:  Nasal valsalva:  Pinch nose and with closed mouth try to "pop" air into ears through the back of the nose. Attempt this several times a day. The more popping you have, the more likely the fluid will resolve.     · Flonase / fluticasone (steroid spray) is best for stuffy, pressure, fullness.  · Astelin / azelastine (antihistamine spray) is best for itchy, drippy, sneezy.    Use as directed, spraying 1-2 times in each nostril each day. It may take 2-3 days to 2-3 weeks to begin seeing improvement. This medication needs to be taken consistently to see results. Overall, this is a well-tolerated medication with low side effects. The benefit of nasal steroids as opposed to oral steroids is that the nasal steroid spray works primarily in the nose. Common side effects can include: headache, nasal dryness, minor nose bleed.  Rare side effects may include:  septal perforation, elevation in eye pressure, dry eyes, change in smell, allergic reaction.  Notify your provider if you have any concerns or experience these symptoms.     Nasal spray instructions:  Blow nose first gently to clean. Keep chin level with the floor (do not tilt head forward or back). Insert nasal spray taking caution to direct it AWAY from the middle wall inside the nose (septum) to avoid irritating nasal septum which could cause nosebleed.  Do not tilt spray up but rather flat and out along the roof of your mouth to spray. Angle the tip of the spray out slightly toward the direction of the ears; then spray. Do not take quick vigorous sniff but rather slow gentle inhalation while waiting for medication to absorb into nasal passages. Then administer second spray in same way. "

## 2022-01-07 NOTE — PROGRESS NOTES
"Subjective:       Patient ID: Daron Lea is a 36 y.o. male.    Chief Complaint: Otitis Media (Recurrent, fluid in ear) and Sinus Problem (Sore throat)    HPI   Patient is new to ENT, self-referred for chronic ear issues. Patient states loud noises significantly bother his ears, AS>AU. He has been treated for 3 ear infections in the past year. The two most recent were approx one month ago. He had a televisit for which he was prescribed antibiotic ear drops, then followed up with his PCP who prescribed oral antibiotics. 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     Review of Systems   Constitutional: Negative.    HENT: Positive for ear pain, hearing loss and sore throat. Negative for ear discharge.    Eyes: Negative.    Respiratory: Negative.    Cardiovascular: Negative.    Gastrointestinal: Negative.    Endocrine: Negative.    Genitourinary: Negative.    Musculoskeletal: Negative.    Integumentary:  Negative.   Neurological: Negative.    Hematological: Negative.    Psychiatric/Behavioral: Negative.          Objective:      Physical Exam  Vitals and nursing note reviewed.   Constitutional:       General: He is not in acute distress.Vital signs are normal.      Appearance: He is well-developed and well-nourished. He is not ill-appearing or diaphoretic.   HENT:      Head: Normocephalic and atraumatic.      Right Ear: Hearing, ear canal and external ear normal. No middle ear effusion. Tympanic membrane is not erythematous. Tympanic membrane has normal mobility (type "A" borderline type "C" with MEP -151).      Left Ear: Hearing, ear canal and external ear normal.  No middle ear effusion. Tympanic membrane is retracted. Tympanic membrane is not erythematous. Tympanic membrane has decreased mobility (type "C" with low compliance, MEP -286).      Nose: Nose normal.      Mouth/Throat:      Mouth: Oropharynx is clear and moist and mucous membranes are normal.      Pharynx: Uvula midline.   Eyes:      General: Lids are " normal. No scleral icterus.        Right eye: No discharge.         Left eye: No discharge.      Extraocular Movements: EOM normal.   Neck:      Trachea: Trachea normal. No tracheal deviation.   Cardiovascular:      Rate and Rhythm: Normal rate.   Pulmonary:      Effort: Pulmonary effort is normal. No respiratory distress.      Breath sounds: No stridor. No wheezing.   Musculoskeletal:         General: Normal range of motion.      Cervical back: Normal range of motion and neck supple.   Skin:     General: Skin is warm, dry and intact.      Coloration: Skin is not pale.      Nails: There is no cyanosis.   Neurological:      Mental Status: He is alert and oriented to person, place, and time.      Coordination: Coordination normal.      Gait: Gait normal.      Deep Tendon Reflexes: Strength normal.   Psychiatric:         Mood and Affect: Mood and affect normal.         Speech: Speech normal.         Behavior: Behavior normal. Behavior is cooperative.         Thought Content: Thought content normal.         Cognition and Memory: Cognition and memory normal.         Judgment: Judgment normal.         Assessment:       Problem List Items Addressed This Visit    None     Visit Diagnoses     ETD (Eustachian tube dysfunction), bilateral    -  Primary    Relevant Medications    fluticasone propionate (FLONASE) 50 mcg/actuation nasal spray    azelastine (ASTELIN) 137 mcg (0.1 %) nasal spray          Plan:         Discussion centered around ETD:  Normal middle ear pressure is -150 to +50.   Your right middle ear pressure is -151, borderline abnormal.  Your left middle ear pressure is -286, abnormally low. This causes a retraction of the ear drum (which is the opposite of a bulging ear drum).   ETD protocol given and discussed:   Flonase, Astelin, nasal valsalva  Patient encouraged to return to clinic if symptoms worsen/persist and as needed for further ENT symptoms or concerns.         Answers for HPI/ROS submitted by the  patient on 1/7/2022  Ear infection(s)?: Yes  Seasonal Allergies?: Yes

## 2022-01-24 ENCOUNTER — PATIENT MESSAGE (OUTPATIENT)
Dept: INTERNAL MEDICINE | Facility: CLINIC | Age: 37
End: 2022-01-24
Payer: COMMERCIAL

## 2022-01-24 DIAGNOSIS — Z00.00 ROUTINE GENERAL MEDICAL EXAMINATION AT A HEALTH CARE FACILITY: Primary | ICD-10-CM

## 2022-01-25 DIAGNOSIS — Z00.00 ROUTINE GENERAL MEDICAL EXAMINATION AT A HEALTH CARE FACILITY: Primary | ICD-10-CM

## 2022-02-03 ENCOUNTER — OFFICE VISIT (OUTPATIENT)
Dept: INTERNAL MEDICINE | Facility: CLINIC | Age: 37
End: 2022-02-03
Payer: COMMERCIAL

## 2022-02-03 ENCOUNTER — CLINICAL SUPPORT (OUTPATIENT)
Dept: INTERNAL MEDICINE | Facility: CLINIC | Age: 37
End: 2022-02-03

## 2022-02-03 ENCOUNTER — HOSPITAL ENCOUNTER (OUTPATIENT)
Dept: CARDIOLOGY | Facility: CLINIC | Age: 37
Discharge: HOME | End: 2022-02-03

## 2022-02-03 ENCOUNTER — PATIENT MESSAGE (OUTPATIENT)
Dept: INTERNAL MEDICINE | Facility: CLINIC | Age: 37
End: 2022-02-03
Payer: COMMERCIAL

## 2022-02-03 ENCOUNTER — PATIENT MESSAGE (OUTPATIENT)
Dept: INTERNAL MEDICINE | Facility: CLINIC | Age: 37
End: 2022-02-03

## 2022-02-03 VITALS
SYSTOLIC BLOOD PRESSURE: 108 MMHG | HEIGHT: 71 IN | WEIGHT: 201 LBS | BODY MASS INDEX: 28.14 KG/M2 | DIASTOLIC BLOOD PRESSURE: 73 MMHG | HEART RATE: 67 BPM

## 2022-02-03 DIAGNOSIS — Z00.00 ENCOUNTER FOR ANNUAL HEALTH EXAMINATION: Primary | ICD-10-CM

## 2022-02-03 DIAGNOSIS — Z00.00 ROUTINE GENERAL MEDICAL EXAMINATION AT A HEALTH CARE FACILITY: ICD-10-CM

## 2022-02-03 DIAGNOSIS — Z00.00 ANNUAL PHYSICAL EXAM: Primary | ICD-10-CM

## 2022-02-03 LAB
ALBUMIN SERPL BCP-MCNC: 4.1 G/DL (ref 3.5–5.2)
ALP SERPL-CCNC: 76 U/L (ref 55–135)
ALT SERPL W/O P-5'-P-CCNC: 25 U/L (ref 10–44)
ANION GAP SERPL CALC-SCNC: 8 MMOL/L (ref 8–16)
AST SERPL-CCNC: 14 U/L (ref 10–40)
BILIRUB SERPL-MCNC: 0.7 MG/DL (ref 0.1–1)
BUN SERPL-MCNC: 17 MG/DL (ref 6–20)
CALCIUM SERPL-MCNC: 9.7 MG/DL (ref 8.7–10.5)
CHLORIDE SERPL-SCNC: 108 MMOL/L (ref 95–110)
CHOLEST SERPL-MCNC: 229 MG/DL (ref 120–199)
CHOLEST/HDLC SERPL: 6.9 {RATIO} (ref 2–5)
CO2 SERPL-SCNC: 25 MMOL/L (ref 23–29)
CREAT SERPL-MCNC: 1 MG/DL (ref 0.5–1.4)
ERYTHROCYTE [DISTWIDTH] IN BLOOD BY AUTOMATED COUNT: 12.1 % (ref 11.5–14.5)
EST. GFR  (AFRICAN AMERICAN): >60 ML/MIN/1.73 M^2
EST. GFR  (NON AFRICAN AMERICAN): >60 ML/MIN/1.73 M^2
ESTIMATED AVG GLUCOSE: 103 MG/DL (ref 68–131)
GLUCOSE SERPL-MCNC: 93 MG/DL (ref 70–110)
HBA1C MFR BLD: 5.2 % (ref 4–5.6)
HCT VFR BLD AUTO: 47.4 % (ref 40–54)
HDLC SERPL-MCNC: 33 MG/DL (ref 40–75)
HDLC SERPL: 14.4 % (ref 20–50)
HGB BLD-MCNC: 15.8 G/DL (ref 14–18)
LDLC SERPL CALC-MCNC: 151.8 MG/DL (ref 63–159)
MCH RBC QN AUTO: 29.5 PG (ref 27–31)
MCHC RBC AUTO-ENTMCNC: 33.3 G/DL (ref 32–36)
MCV RBC AUTO: 89 FL (ref 82–98)
NONHDLC SERPL-MCNC: 196 MG/DL
PLATELET # BLD AUTO: 282 K/UL (ref 150–450)
PMV BLD AUTO: 9.2 FL (ref 9.2–12.9)
POTASSIUM SERPL-SCNC: 4.5 MMOL/L (ref 3.5–5.1)
PROT SERPL-MCNC: 7.7 G/DL (ref 6–8.4)
RBC # BLD AUTO: 5.35 M/UL (ref 4.6–6.2)
SODIUM SERPL-SCNC: 141 MMOL/L (ref 136–145)
TRIGL SERPL-MCNC: 221 MG/DL (ref 30–150)
TSH SERPL DL<=0.005 MIU/L-ACNC: 1.12 UIU/ML (ref 0.4–4)
WBC # BLD AUTO: 7.17 K/UL (ref 3.9–12.7)

## 2022-02-03 PROCEDURE — 3078F DIAST BP <80 MM HG: CPT | Mod: CPTII,S$GLB,, | Performed by: INTERNAL MEDICINE

## 2022-02-03 PROCEDURE — 1159F PR MEDICATION LIST DOCUMENTED IN MEDICAL RECORD: ICD-10-PCS | Mod: CPTII,S$GLB,, | Performed by: INTERNAL MEDICINE

## 2022-02-03 PROCEDURE — 36415 COLL VENOUS BLD VENIPUNCTURE: CPT | Performed by: INTERNAL MEDICINE

## 2022-02-03 PROCEDURE — 99999 PR PBB SHADOW E&M-EST. PATIENT-LVL III: ICD-10-PCS | Mod: PBBFAC,,, | Performed by: INTERNAL MEDICINE

## 2022-02-03 PROCEDURE — 80053 COMPREHEN METABOLIC PANEL: CPT | Performed by: INTERNAL MEDICINE

## 2022-02-03 PROCEDURE — 85027 COMPLETE CBC AUTOMATED: CPT | Performed by: INTERNAL MEDICINE

## 2022-02-03 PROCEDURE — 93005 ELECTROCARDIOGRAM TRACING: CPT | Mod: S$GLB,,, | Performed by: FAMILY MEDICINE

## 2022-02-03 PROCEDURE — 3044F PR MOST RECENT HEMOGLOBIN A1C LEVEL <7.0%: ICD-10-PCS | Mod: CPTII,S$GLB,, | Performed by: INTERNAL MEDICINE

## 2022-02-03 PROCEDURE — 80061 LIPID PANEL: CPT | Performed by: INTERNAL MEDICINE

## 2022-02-03 PROCEDURE — 99395 PR PREVENTIVE VISIT,EST,18-39: ICD-10-PCS | Mod: S$GLB,,, | Performed by: INTERNAL MEDICINE

## 2022-02-03 PROCEDURE — 1159F MED LIST DOCD IN RCRD: CPT | Mod: CPTII,S$GLB,, | Performed by: INTERNAL MEDICINE

## 2022-02-03 PROCEDURE — 83036 HEMOGLOBIN GLYCOSYLATED A1C: CPT | Performed by: INTERNAL MEDICINE

## 2022-02-03 PROCEDURE — 93010 ELECTROCARDIOGRAM REPORT: CPT | Mod: S$GLB,,, | Performed by: INTERNAL MEDICINE

## 2022-02-03 PROCEDURE — 3078F PR MOST RECENT DIASTOLIC BLOOD PRESSURE < 80 MM HG: ICD-10-PCS | Mod: CPTII,S$GLB,, | Performed by: INTERNAL MEDICINE

## 2022-02-03 PROCEDURE — 84443 ASSAY THYROID STIM HORMONE: CPT | Performed by: INTERNAL MEDICINE

## 2022-02-03 PROCEDURE — 3074F PR MOST RECENT SYSTOLIC BLOOD PRESSURE < 130 MM HG: ICD-10-PCS | Mod: CPTII,S$GLB,, | Performed by: INTERNAL MEDICINE

## 2022-02-03 PROCEDURE — 99999 PR PBB SHADOW E&M-EST. PATIENT-LVL III: CPT | Mod: PBBFAC,,, | Performed by: INTERNAL MEDICINE

## 2022-02-03 PROCEDURE — 99999 PR PBB SHADOW E&M-EST. PATIENT-LVL I: CPT | Mod: PBBFAC,,,

## 2022-02-03 PROCEDURE — 3008F BODY MASS INDEX DOCD: CPT | Mod: CPTII,S$GLB,, | Performed by: INTERNAL MEDICINE

## 2022-02-03 PROCEDURE — 99999 PR PBB SHADOW E&M-EST. PATIENT-LVL I: ICD-10-PCS | Mod: PBBFAC,,,

## 2022-02-03 PROCEDURE — 3044F HG A1C LEVEL LT 7.0%: CPT | Mod: CPTII,S$GLB,, | Performed by: INTERNAL MEDICINE

## 2022-02-03 PROCEDURE — 93010 EKG 12-LEAD: ICD-10-PCS | Mod: S$GLB,,, | Performed by: INTERNAL MEDICINE

## 2022-02-03 PROCEDURE — 3074F SYST BP LT 130 MM HG: CPT | Mod: CPTII,S$GLB,, | Performed by: INTERNAL MEDICINE

## 2022-02-03 PROCEDURE — 99395 PREV VISIT EST AGE 18-39: CPT | Mod: S$GLB,,, | Performed by: INTERNAL MEDICINE

## 2022-02-03 PROCEDURE — 93005 EKG 12-LEAD: ICD-10-PCS | Mod: S$GLB,,, | Performed by: FAMILY MEDICINE

## 2022-02-03 PROCEDURE — 3008F PR BODY MASS INDEX (BMI) DOCUMENTED: ICD-10-PCS | Mod: CPTII,S$GLB,, | Performed by: INTERNAL MEDICINE

## 2022-02-03 NOTE — LETTER
2/3/2022    Daron Lea  [address] [address]       [facility]  [address]  [facility] [address]  Phone: [phone]  Fax: [phone] Dear Mr. Lea:    Thank you for allowing me to serve you and perform your Executive Health exam on 2/3/2022.  This letter will serve as a brief summary of our discussions at that time.      Past Medical History:   Diagnosis Date    Allergic rhinitis     History of ear infections     Hyperlipidemia        Past Surgical History:   Procedure Laterality Date    ANTERIOR CRUCIATE LIGAMENT REPAIR Left     SHOULDER ARTHROSCOPY W/ LABRAL REPAIR Right        Immunization History   Administered Date(s) Administered    COVID-19, MRNA, LN-S, PF (MODERNA FULL 0.5 ML DOSE) 02/02/2021, 02/02/2021, 03/16/2021, 03/16/2021    Influenza 12/12/2020    Influenza - Quadrivalent - PF *Preferred* (6 months and older) 10/25/2018, 10/25/2018, 10/24/2019, 12/14/2020    Tdap 10/24/2015       Assessment/Recommendations:    Health Maintenance updates:  - Vaccines up to date    - Blood pressure and glucose levels are normal.       High cholesterol - Persistent mild elevation of triglycerides and LDL (bad cholesterol). Currently taking omega 3 supplement. Focusing on getting weight down to below 190 lbs, increase cardio exercise. Continue limitation of sugars, carbs and saturated fats. Repeat levels 6 to 12 months.     Rhinitis - Controlled on daily Flonase, Singulair, antihistamine.     Ear infection history - Frequent over past several months. Following with ENT, possibility of tube placement on left is being considered.      It was a pleasure seeing you for your health exam, Daron. Unless noted above, I did not find any other concerning abnormalities in your history, physical exam or lab work.    If you have any questions or concerns, please don't hesitate to call.    Sincerely,    Siva Weber MD

## 2022-02-03 NOTE — PROGRESS NOTES
Subjective:       Patient ID: Daron Lea is a 36 y.o. male.    Chief Complaint: Executive Health      HPI  Annual health exam. Reviewed medical, surgical, social and family history, medications, appropriate preventive health screenings, as well as vaccination history. Updates as noted below or in assessment and plan.    Executive health exam through work. Works for [employer]. Lives in [place], [place]. Plays golf though notes less active and less exercise after ankle sprain a few months ago. Hx of HLD. Feels he has made heavy changes in terms of cutting out fats, carbs and sugars.    This past year had multiple ear infections. No active infection currently but ENT is considering tube placement on left.    Review of Systems   Constitutional: Negative for chills, fatigue and fever.   HENT: Negative for hearing loss and sinus pain.    Eyes: Negative for visual disturbance.   Respiratory: Negative for cough, chest tightness and shortness of breath.    Cardiovascular: Negative for chest pain and leg swelling.   Gastrointestinal: Negative for abdominal pain, blood in stool, diarrhea and nausea.   Genitourinary: Negative for difficulty urinating, dysuria and frequency.   Musculoskeletal: Negative for arthralgias, gait problem and joint swelling.   Skin: Negative for rash and wound.   Neurological: Negative for dizziness, syncope, weakness, numbness and headaches.   Psychiatric/Behavioral: Negative for dysphoric mood. The patient is not nervous/anxious.        Past Medical History:   Diagnosis Date    Allergic rhinitis     History of ear infections     Hyperlipidemia          Current Outpatient Medications:     fluticasone propionate (FLONASE) 50 mcg/actuation nasal spray, 1 spray (50 mcg total) by Each Nostril route 2 (two) times daily., Disp: 16 g, Rfl: 12    loratadine (CLARITIN) 10 mg tablet, Take 10 mg by mouth once daily., Disp: , Rfl:     montelukast (SINGULAIR) 10 mg tablet, Take 10 mg by mouth every  evening., Disp: , Rfl:     Past Surgical History:   Procedure Laterality Date    ANTERIOR CRUCIATE LIGAMENT REPAIR Left     SHOULDER ARTHROSCOPY W/ LABRAL REPAIR Right        Family History   Problem Relation Age of Onset    Breast cancer Mother     Colon cancer Neg Hx        Social History     Tobacco Use    Smoking status: Never Smoker    Smokeless tobacco: Never Used   Substance Use Topics    Alcohol use: Yes     Comment: Occasional    Drug use: Never       Immunization History   Administered Date(s) Administered    COVID-19, MRNA, LN-S, PF (MODERNA FULL 0.5 ML DOSE) 02/02/2021, 02/02/2021, 03/16/2021, 03/16/2021    Influenza 12/12/2020    Influenza - Quadrivalent - PF *Preferred* (6 months and older) 10/25/2018, 10/25/2018, 10/24/2019, 12/14/2020    Tdap 10/24/2015         Objective:      Vitals:    02/03/22 0852   BP: 108/73   Pulse: 67       Physical Exam  Constitutional:       General: He is not in acute distress.     Appearance: Normal appearance. He is well-developed. He is not ill-appearing.   HENT:      Head: Normocephalic and atraumatic.      Right Ear: Hearing and tympanic membrane normal. There is no impacted cerumen.      Left Ear: Hearing normal. There is no impacted cerumen.      Ears:      Comments: Cloudy appearance of left TM noted.     Nose: Nose normal.   Eyes:      Extraocular Movements: Extraocular movements intact.      Conjunctiva/sclera: Conjunctivae normal.   Cardiovascular:      Rate and Rhythm: Normal rate and regular rhythm.      Heart sounds: Normal heart sounds. No murmur heard.      Pulmonary:      Effort: Pulmonary effort is normal. No respiratory distress.      Breath sounds: Normal breath sounds. No wheezing, rhonchi or rales.   Abdominal:      General: Abdomen is flat. There is no distension.      Palpations: Abdomen is soft.   Musculoskeletal:         General: No swelling or deformity.      Cervical back: No tenderness.      Right lower leg: No edema.      Left  lower leg: No edema.   Lymphadenopathy:      Cervical: No cervical adenopathy.   Skin:     General: Skin is warm and dry.      Findings: No lesion or rash.   Neurological:      General: No focal deficit present.      Mental Status: He is alert and oriented to person, place, and time.      Cranial Nerves: No cranial nerve deficit.      Coordination: Coordination normal.      Gait: Gait normal.   Psychiatric:         Mood and Affect: Mood normal.         Behavior: Behavior normal.         Thought Content: Thought content normal.         Judgment: Judgment normal.         Recent Results (from the past 2016 hour(s))   Comprehensive metabolic panel    Collection Time: 02/03/22  8:22 AM   Result Value Ref Range    Sodium 141 136 - 145 mmol/L    Potassium 4.5 3.5 - 5.1 mmol/L    Chloride 108 95 - 110 mmol/L    CO2 25 23 - 29 mmol/L    Glucose 93 70 - 110 mg/dL    BUN 17 6 - 20 mg/dL    Creatinine 1.0 0.5 - 1.4 mg/dL    Calcium 9.7 8.7 - 10.5 mg/dL    Total Protein 7.7 6.0 - 8.4 g/dL    Albumin 4.1 3.5 - 5.2 g/dL    Total Bilirubin 0.7 0.1 - 1.0 mg/dL    Alkaline Phosphatase 76 55 - 135 U/L    AST 14 10 - 40 U/L    ALT 25 10 - 44 U/L    Anion Gap 8 8 - 16 mmol/L    eGFR if African American >60.0 >60 mL/min/1.73 m^2    eGFR if non African American >60.0 >60 mL/min/1.73 m^2   CBC Without Differential    Collection Time: 02/03/22  8:22 AM   Result Value Ref Range    WBC 7.17 3.90 - 12.70 K/uL    RBC 5.35 4.60 - 6.20 M/uL    Hemoglobin 15.8 14.0 - 18.0 g/dL    Hematocrit 47.4 40.0 - 54.0 %    MCV 89 82 - 98 fL    MCH 29.5 27.0 - 31.0 pg    MCHC 33.3 32.0 - 36.0 g/dL    RDW 12.1 11.5 - 14.5 %    Platelets 282 150 - 450 K/uL    MPV 9.2 9.2 - 12.9 fL   Lipid panel    Collection Time: 02/03/22  8:22 AM   Result Value Ref Range    Cholesterol 229 (H) 120 - 199 mg/dL    Triglycerides 221 (H) 30 - 150 mg/dL    HDL 33 (L) 40 - 75 mg/dL    LDL Cholesterol 151.8 63.0 - 159.0 mg/dL    HDL/Cholesterol Ratio 14.4 (L) 20.0 - 50.0 %    Total  Cholesterol/HDL Ratio 6.9 (H) 2.0 - 5.0    Non-HDL Cholesterol 196 mg/dL   Hemoglobin A1c    Collection Time: 02/03/22  8:22 AM   Result Value Ref Range    Hemoglobin A1C 5.2 4.0 - 5.6 %    Estimated Avg Glucose 103 68 - 131 mg/dL   TSH    Collection Time: 02/03/22  8:22 AM   Result Value Ref Range    TSH 1.123 0.400 - 4.000 uIU/mL          EKG: NSR    Assessment/Plan:     1) Health Maintenance updates:  - Vaccines up to date  - BP, a1c wnl    2) HLD - Persistent mild elevation of TG and LDL. Currently taking omega 3 supplement. Discussed goals of weight down to below 190 lbs and increased cardio. Continue limitation of sugars, carbs and saturated fats. Repeat levels 6 to 12 mths.    3) Rhinitis - Controlled on daily Flonase, Singulair, non-sedating antihistamine.    4) Ear infection history - Frequent over past several months. Following with ENT and notes possibility of tube placement on left is being considered.

## 2022-02-09 ENCOUNTER — PATIENT MESSAGE (OUTPATIENT)
Dept: INTERNAL MEDICINE | Facility: CLINIC | Age: 37
End: 2022-02-09
Payer: COMMERCIAL

## 2022-03-17 ENCOUNTER — PATIENT MESSAGE (OUTPATIENT)
Dept: OTOLARYNGOLOGY | Facility: CLINIC | Age: 37
End: 2022-03-17
Payer: COMMERCIAL

## 2022-04-27 ENCOUNTER — PATIENT MESSAGE (OUTPATIENT)
Dept: INTERNAL MEDICINE | Facility: CLINIC | Age: 37
End: 2022-04-27
Payer: COMMERCIAL

## 2022-04-27 DIAGNOSIS — B00.1 HERPES LABIALIS: Primary | ICD-10-CM

## 2022-04-27 RX ORDER — VALACYCLOVIR HYDROCHLORIDE 1 G/1
2000 TABLET, FILM COATED ORAL 2 TIMES DAILY
Qty: 4 TABLET | Refills: 2 | Status: SHIPPED | OUTPATIENT
Start: 2022-04-27 | End: 2022-08-02

## 2022-04-28 ENCOUNTER — PATIENT MESSAGE (OUTPATIENT)
Dept: INTERNAL MEDICINE | Facility: CLINIC | Age: 37
End: 2022-04-28
Payer: COMMERCIAL

## 2022-04-28 DIAGNOSIS — H66.90 OTITIS MEDIA, UNSPECIFIED LATERALITY, UNSPECIFIED OTITIS MEDIA TYPE: Primary | ICD-10-CM

## 2022-04-29 RX ORDER — AZITHROMYCIN 250 MG/1
TABLET, FILM COATED ORAL
Qty: 6 TABLET | Refills: 0 | Status: SHIPPED | OUTPATIENT
Start: 2022-04-29 | End: 2022-05-04

## 2022-05-04 DIAGNOSIS — H69.93 ETD (EUSTACHIAN TUBE DYSFUNCTION), BILATERAL: ICD-10-CM

## 2022-05-04 RX ORDER — AZELASTINE 1 MG/ML
SPRAY, METERED NASAL
Qty: 30 ML | Refills: 12 | Status: SHIPPED | OUTPATIENT
Start: 2022-05-04 | End: 2023-04-18

## 2022-05-04 NOTE — TELEPHONE ENCOUNTER
----- Message from Ariane Avelar sent at 5/4/2022  9:05 AM CDT -----  Contact: Екатерина  Type:  Pharmacy Calling to Clarify an RX  Name of Caller:  Екатерина   Pharmacy Name:  [facility] [place] [address], [address]   Phone:  [phone]  Fax:  [phone]  Prescription Name:  Azelastine 1%  What do they need to clarify?: requested to change to .15mg  Best Call Back Number:  [phone]  Additional Information: Rx Azelastine 1% needs to be changed to .15mg due to being on back order, per Екатерина.

## 2022-05-20 ENCOUNTER — PATIENT MESSAGE (OUTPATIENT)
Dept: INTERNAL MEDICINE | Facility: CLINIC | Age: 37
End: 2022-05-20
Payer: COMMERCIAL

## 2022-05-20 DIAGNOSIS — R05.9 COUGH: Primary | ICD-10-CM

## 2022-05-20 RX ORDER — HYDROCODONE BITARTRATE AND HOMATROPINE METHYLBROMIDE ORAL SOLUTION 5; 1.5 MG/5ML; MG/5ML
5 LIQUID ORAL EVERY 6 HOURS PRN
Qty: 120 ML | Refills: 0 | Status: SHIPPED | OUTPATIENT
Start: 2022-05-20 | End: 2023-04-18

## 2022-08-02 ENCOUNTER — PATIENT MESSAGE (OUTPATIENT)
Dept: INTERNAL MEDICINE | Facility: CLINIC | Age: 37
End: 2022-08-02
Payer: COMMERCIAL

## 2022-08-02 DIAGNOSIS — B00.1 HERPES LABIALIS: ICD-10-CM

## 2022-08-02 RX ORDER — VALACYCLOVIR HYDROCHLORIDE 1 G/1
2000 TABLET, FILM COATED ORAL 2 TIMES DAILY
Qty: 4 TABLET | Refills: 0 | Status: SHIPPED | OUTPATIENT
Start: 2022-08-02 | End: 2023-04-18

## 2022-08-29 ENCOUNTER — PATIENT MESSAGE (OUTPATIENT)
Dept: OTOLARYNGOLOGY | Facility: CLINIC | Age: 37
End: 2022-08-29
Payer: COMMERCIAL

## 2022-09-12 ENCOUNTER — OFFICE VISIT (OUTPATIENT)
Dept: OTOLARYNGOLOGY | Facility: CLINIC | Age: 37
End: 2022-09-12
Payer: COMMERCIAL

## 2022-09-12 ENCOUNTER — PROCEDURE VISIT (OUTPATIENT)
Dept: OTOLARYNGOLOGY | Facility: CLINIC | Age: 37
End: 2022-09-12
Payer: COMMERCIAL

## 2022-09-12 VITALS — BODY MASS INDEX: 30.47 KG/M2 | TEMPERATURE: 99 F | WEIGHT: 217.63 LBS | HEIGHT: 71 IN

## 2022-09-12 VITALS — HEIGHT: 71 IN | WEIGHT: 217.63 LBS | BODY MASS INDEX: 30.47 KG/M2

## 2022-09-12 DIAGNOSIS — J30.9 ALLERGIC RHINITIS, UNSPECIFIED SEASONALITY, UNSPECIFIED TRIGGER: ICD-10-CM

## 2022-09-12 DIAGNOSIS — J32.9 CHRONIC SINUSITIS, UNSPECIFIED LOCATION: Primary | ICD-10-CM

## 2022-09-12 DIAGNOSIS — H69.90 DYSFUNCTION OF EUSTACHIAN TUBE, UNSPECIFIED LATERALITY: Primary | ICD-10-CM

## 2022-09-12 DIAGNOSIS — J32.9 CHRONIC SINUSITIS, UNSPECIFIED LOCATION: ICD-10-CM

## 2022-09-12 PROCEDURE — 1159F MED LIST DOCD IN RCRD: CPT | Mod: CPTII,S$GLB,, | Performed by: STUDENT IN AN ORGANIZED HEALTH CARE EDUCATION/TRAINING PROGRAM

## 2022-09-12 PROCEDURE — 99499 UNLISTED E&M SERVICE: CPT | Mod: S$GLB,,, | Performed by: STUDENT IN AN ORGANIZED HEALTH CARE EDUCATION/TRAINING PROGRAM

## 2022-09-12 PROCEDURE — 99999 PR PBB SHADOW E&M-EST. PATIENT-LVL II: CPT | Mod: PBBFAC,,, | Performed by: STUDENT IN AN ORGANIZED HEALTH CARE EDUCATION/TRAINING PROGRAM

## 2022-09-12 PROCEDURE — 31231 PR NASAL ENDOSCOPY, DX: ICD-10-PCS | Mod: S$GLB,,, | Performed by: STUDENT IN AN ORGANIZED HEALTH CARE EDUCATION/TRAINING PROGRAM

## 2022-09-12 PROCEDURE — 99999 PR PBB SHADOW E&M-EST. PATIENT-LVL II: ICD-10-PCS | Mod: PBBFAC,,, | Performed by: STUDENT IN AN ORGANIZED HEALTH CARE EDUCATION/TRAINING PROGRAM

## 2022-09-12 PROCEDURE — 3044F HG A1C LEVEL LT 7.0%: CPT | Mod: CPTII,S$GLB,, | Performed by: STUDENT IN AN ORGANIZED HEALTH CARE EDUCATION/TRAINING PROGRAM

## 2022-09-12 PROCEDURE — 31231 NASAL ENDOSCOPY DX: CPT | Mod: S$GLB,,, | Performed by: STUDENT IN AN ORGANIZED HEALTH CARE EDUCATION/TRAINING PROGRAM

## 2022-09-12 PROCEDURE — 3008F BODY MASS INDEX DOCD: CPT | Mod: CPTII,S$GLB,, | Performed by: STUDENT IN AN ORGANIZED HEALTH CARE EDUCATION/TRAINING PROGRAM

## 2022-09-12 PROCEDURE — 1159F PR MEDICATION LIST DOCUMENTED IN MEDICAL RECORD: ICD-10-PCS | Mod: CPTII,S$GLB,, | Performed by: STUDENT IN AN ORGANIZED HEALTH CARE EDUCATION/TRAINING PROGRAM

## 2022-09-12 PROCEDURE — 99213 OFFICE O/P EST LOW 20 MIN: CPT | Mod: 25,S$GLB,, | Performed by: STUDENT IN AN ORGANIZED HEALTH CARE EDUCATION/TRAINING PROGRAM

## 2022-09-12 PROCEDURE — 99213 PR OFFICE/OUTPT VISIT, EST, LEVL III, 20-29 MIN: ICD-10-PCS | Mod: 25,S$GLB,, | Performed by: STUDENT IN AN ORGANIZED HEALTH CARE EDUCATION/TRAINING PROGRAM

## 2022-09-12 PROCEDURE — 3008F PR BODY MASS INDEX (BMI) DOCUMENTED: ICD-10-PCS | Mod: CPTII,S$GLB,, | Performed by: STUDENT IN AN ORGANIZED HEALTH CARE EDUCATION/TRAINING PROGRAM

## 2022-09-12 PROCEDURE — 99499 NO LOS: ICD-10-PCS | Mod: S$GLB,,, | Performed by: STUDENT IN AN ORGANIZED HEALTH CARE EDUCATION/TRAINING PROGRAM

## 2022-09-12 PROCEDURE — 3044F PR MOST RECENT HEMOGLOBIN A1C LEVEL <7.0%: ICD-10-PCS | Mod: CPTII,S$GLB,, | Performed by: STUDENT IN AN ORGANIZED HEALTH CARE EDUCATION/TRAINING PROGRAM

## 2022-09-12 NOTE — PROGRESS NOTES
9/12/22: RT my clinic to discuss ears. Has daily pressure. Does not sound muffled, sometimes ringing but not often. Not dizzy. Also has near daily sinus complaints. Pressure, congestion, PND. Denies hyposmia. Abx 2-3 year for sinus and ears. Has wax cleaned out a few weeks ago Pops his ears a million times a day but still feels pressure. Taking flonase and astelin BID, sinus rinses when sinuses bother him more. Singulair, zyrtec daily, psuedophed PRN. He can tell if he misses any of these, because ears and sinuses feel pressure.     Tmi and mobile AU, valsavla+    Procedure: Nasal Endoscopy  Indications: chronic sinusitis   Verbal consent obtained  Anesthesia: topical lidocaine and phenylephrine spray    Procedure: flexible 0 degree endoscope was passed into each nare to nasopharynx showing findings below.     Septum deviates slightly left. Nasal mucosa dry, inflamed, thick clear rhinorrhea with some crusting throughout. Turbinates are enlarged and do not fully respond to decongestant appropriately. Right maxillary sinus and sphenoethmoidal recess without purulence or polyps, clear thick rhinorrhea. Left maxillary sinus and sphenoethmoidal recess without purulence or polyps, thick clear rhinorrhea. No adenoid hypertrophy. Eustachian tube orifices WNL, some drainage on left.      A/P: ears unremarkable, suspect CRS    Recommend BID sinus rinses, flonase and astelin BID.   Keep taking zyrtec.   Will get CT sinuses. Can consider allergy testing down the road.

## 2022-09-12 NOTE — PROGRESS NOTES
Otolaryngology Clinic Note    Subjective:       Patient ID: Daron Lea is a 37 y.o. male.    Chief Complaint: Ear Fullness      History of Present Illness: Daron Lea is a 37 y.o. male presenting for follow up of ear pressure and sinus complaints. He has seen Michelle.     9/12/22: RT my clinic to discuss ears. Has daily pressure. Does not sound muffled, sometimes ringing but not often. Not dizzy. Also has near daily sinus complaints. Pressure, congestion, PND. Denies hyposmia. Abx 2-3 year for sinus and ears. Has wax cleaned out a few weeks ago Pops his ears a million times a day but still feels pressure. Taking flonase and astelin BID, sinus rinses when sinuses bother him more. Singulair, zyrtec daily, psuedophed PRN. He can tell if he misses any of these, because ears and sinuses feel pressure. No allergy testing in past. No sinus surgery in past.          Past Surgical History:   Procedure Laterality Date    ANTERIOR CRUCIATE LIGAMENT REPAIR Left     SHOULDER ARTHROSCOPY W/ LABRAL REPAIR Right      Past Medical History:   Diagnosis Date    Allergic rhinitis     History of ear infections     Hyperlipidemia      Social Determinants of Health     Tobacco Use: Low Risk     Smoking Tobacco Use: Never    Smokeless Tobacco Use: Never   Alcohol Use: Not on file   Financial Resource Strain: Not on file   Food Insecurity: Not on file   Transportation Needs: Not on file   Physical Activity: Not on file   Stress: Not on file   Social Connections: Not on file   Housing Stability: Not on file   Depression PHQ-4: Not on file     Review of patient's allergies indicates:  No Known Allergies  Current Outpatient Medications   Medication Instructions    azelastine (ASTELIN) 137 mcg (0.1 %) nasal spray INSTILL ONE SPRAY (137 mcg total) by Nasal route TWO (two) TIMES daily.    fluticasone propionate (FLONASE) 50 mcg, Each Nostril, 2 times daily    hydrocodone-homatropine 5-1.5 mg/5 ml (HYCODAN) 5-1.5 mg/5 mL Syrp 5 mLs, Oral,  Every 6 hours PRN    loratadine (CLARITIN) 10 mg, Oral, Daily    montelukast (SINGULAIR) 10 mg, Oral, Nightly    valACYclovir (VALTREX) 2,000 mg, Oral, 2 times daily         14 point ROS below  Patient answers are not available for this visit.            Objective:      Vitals:    09/12/22 1509   Temp: 98.6 °F (37 °C)       General: NAD, well appearing  Eyes: Normal conjunctiva and lids  Face: symmetric, nerve intact  Nose: The nose is without any evidence of any deformity. The nasal mucosa is moist. The septum is midline. There is no evidence of septal hematoma. The turbinates are without abnormality.   Ears: The ears are with normal-appearing pinna. Examination of the canals is normal appearing bilaterally. There is no drainage or erythema noted.  Hearing is grossly intact.TMI and mobile AU, valsavla+ , no evidence of effusions  Mouth: No obvious abnormalities to the lips. The teeth are unremarkable. The gingivae are without any obvious evidence of infection or lesion. The oral mucosa is moist and pink. There are no obvious masses to the hard or soft palate.   Oropharynx: The uvula is midline.  The tongue is midline. The posterior pharynx is without erythema or exudate. The tonsils are normal appearing.  Salivary glands: The salivary glands are symmetric and not enlarged, no masses  Neck: No lymphadenopathy, trachea midline, thryoid not enlarged.  Psych: Normal mood and affect.   Neuro: Grossly intact  Speech: fluent         Procedure: Nasal Endoscopy  Indications: chronic sinusitis   Verbal consent obtained  Anesthesia: topical lidocaine and phenylephrine spray     Procedure: flexible 0 degree endoscope was passed into each nare to nasopharynx showing findings below.      Septum deviates slightly left. Nasal mucosa dry, inflamed, thick clear rhinorrhea with some crusting throughout. Turbinates are enlarged and do not fully respond to decongestant appropriately. Right maxillary sinus and sphenoethmoidal recess  without purulence or polyps, clear thick rhinorrhea. Left maxillary sinus and sphenoethmoidal recess without purulence or polyps, thick clear rhinorrhea. No adenoid hypertrophy. Eustachian tube orifices WNL, some drainage on left.         Assessment and Plan:       1. Dysfunction of Eustachian tube, unspecified laterality    2. Chronic sinusitis, unspecified location    3. Allergic rhinitis, unspecified seasonality, unspecified trigger        ears unremarkable, suspect CRS with mastoid issues as well.      Recommend BID sinus rinses, flonase and astelin BID.   Keep taking zyrtec.   Will get CT sinuses.   Can consider allergy testing down the road. Discussed option for RAST testing, but less useful unless willing to commit to treatment with SCIT or SLIT or with allergist.     RTC 6 weeks with CT sinuses    Plan of care was discussed in detail with the patient, who agreed with the plan as above. All questions were answered in detail.     Emerald Roberson MD  Otolaryngology

## 2022-09-12 NOTE — PROCEDURES
Subjective:       Patient ID: Daron Lea is a 37 y.o. male.    Chief Complaint: Ear Fullness    Ear Fullness   Pertinent negatives include no ear discharge, hearing loss or sore throat.    Patient is new to ENT, self-referred for chronic ear issues. Patient states loud noises significantly bother his ears, AS>AU. He has been treated for 3 ear infections in the past year. The two most recent were approx one month ago. He had a televisit for which he was prescribed antibiotic ear drops, then followed up with his PCP who prescribed oral antibiotics. 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     9/12/22: RT my clinic to discuss ears. Has daily pressure. Does not sound muffled, sometimes ringing but not often. Not dizzy. Also has near daily sinus complaints. Pressure, congestion, PND. Denies hyposmia. Abx 2-3 year for sinus and ears. Has wax cleaned out a few weeks ago Pops his ears a million times a day but still feels pressure. Taking flonase and astelin BID, sinus rinses when sinuses bother him more. Singulair, zyrtec daily, psuedophed PRN. He can tell if he misses any of these, because ears and sinuses feel pressure.     Review of Systems   Constitutional: Negative.    HENT:  Positive for nasal congestion, postnasal drip and sinus pressure/congestion. Negative for ear discharge, ear pain, hearing loss, sore throat and tinnitus.    Eyes: Negative.    Respiratory: Negative.     Cardiovascular: Negative.    Gastrointestinal: Negative.    Endocrine: Negative.    Genitourinary: Negative.    Musculoskeletal: Negative.    Integumentary:  Negative.   Neurological: Negative.  Negative for dizziness.   Hematological: Negative.    Psychiatric/Behavioral: Negative.         Objective:      Physical Exam  Vitals and nursing note reviewed.   Constitutional:       General: He is not in acute distress.     Appearance: He is well-developed. He is not ill-appearing or diaphoretic.   HENT:      Head: Normocephalic and atraumatic.       "Right Ear: Hearing, tympanic membrane, ear canal and external ear normal. No middle ear effusion. Tympanic membrane is not erythematous. Tympanic membrane has normal mobility (type "A" borderline type "C" with MEP -151).      Left Ear: Hearing, ear canal and external ear normal.  No middle ear effusion. Tympanic membrane is not erythematous or retracted. Tympanic membrane has normal mobility (type "C" with low compliance, MEP -286).      Nose: Nose normal.      Mouth/Throat:      Pharynx: Uvula midline.   Eyes:      General: Lids are normal. No scleral icterus.        Right eye: No discharge.         Left eye: No discharge.   Neck:      Trachea: Trachea normal. No tracheal deviation.   Cardiovascular:      Rate and Rhythm: Normal rate.   Pulmonary:      Effort: Pulmonary effort is normal. No respiratory distress.      Breath sounds: No stridor. No wheezing.   Musculoskeletal:         General: Normal range of motion.      Cervical back: Normal range of motion and neck supple.   Skin:     General: Skin is warm and dry.      Coloration: Skin is not pale.   Neurological:      Mental Status: He is alert and oriented to person, place, and time.      Coordination: Coordination normal.      Gait: Gait normal.   Psychiatric:         Speech: Speech normal.         Behavior: Behavior normal. Behavior is cooperative.         Thought Content: Thought content normal.         Judgment: Judgment normal.       Assessment:       Problem List Items Addressed This Visit    None      Plan:         Discussion centered around ETD:  Normal middle ear pressure is -150 to +50.   Your right middle ear pressure is -151, borderline abnormal.  Your left middle ear pressure is -286, abnormally low. This causes a retraction of the ear drum (which is the opposite of a bulging ear drum).   ETD protocol given and discussed:   Flonase, Astelin, nasal valsalva  Patient encouraged to return to clinic if symptoms worsen/persist and as needed for further " ENT symptoms or concerns.         Answers for HPI/ROS submitted by the patient on 1/7/2022  Ear infection(s)?: Yes  Seasonal Allergies?: Yes      Procedures

## 2023-01-19 ENCOUNTER — PATIENT MESSAGE (OUTPATIENT)
Dept: INTERNAL MEDICINE | Facility: CLINIC | Age: 38
End: 2023-01-19
Payer: COMMERCIAL

## 2023-03-28 ENCOUNTER — PATIENT MESSAGE (OUTPATIENT)
Dept: INTERNAL MEDICINE | Facility: CLINIC | Age: 38
End: 2023-03-28
Payer: COMMERCIAL

## 2023-03-28 DIAGNOSIS — Z00.00 ANNUAL PHYSICAL EXAM: Primary | ICD-10-CM

## 2023-03-30 DIAGNOSIS — Z00.00 ANNUAL PHYSICAL EXAM: Primary | ICD-10-CM

## 2023-04-11 ENCOUNTER — CLINICAL SUPPORT (OUTPATIENT)
Dept: INTERNAL MEDICINE | Facility: CLINIC | Age: 38
End: 2023-04-11
Attending: FAMILY MEDICINE

## 2023-04-11 ENCOUNTER — CLINICAL SUPPORT (OUTPATIENT)
Dept: CARDIOLOGY | Facility: HOSPITAL | Age: 38
End: 2023-04-11
Attending: FAMILY MEDICINE

## 2023-04-11 VITALS — HEIGHT: 71 IN | WEIGHT: 217 LBS | BODY MASS INDEX: 30.38 KG/M2

## 2023-04-11 DIAGNOSIS — Z00.00 ANNUAL PHYSICAL EXAM: ICD-10-CM

## 2023-04-11 DIAGNOSIS — Z00.00 ANNUAL PHYSICAL EXAM: Primary | ICD-10-CM

## 2023-04-11 LAB
ALBUMIN SERPL BCP-MCNC: 4 G/DL (ref 3.5–5.2)
ALP SERPL-CCNC: 77 U/L (ref 55–135)
ALT SERPL W/O P-5'-P-CCNC: 21 U/L (ref 10–44)
ANION GAP SERPL CALC-SCNC: 11 MMOL/L (ref 8–16)
AST SERPL-CCNC: 16 U/L (ref 10–40)
BILIRUB SERPL-MCNC: 0.5 MG/DL (ref 0.1–1)
BILIRUB UR QL STRIP: NEGATIVE
BUN SERPL-MCNC: 17 MG/DL (ref 6–20)
CALCIUM SERPL-MCNC: 9.1 MG/DL (ref 8.7–10.5)
CHLORIDE SERPL-SCNC: 107 MMOL/L (ref 95–110)
CHOLEST SERPL-MCNC: 223 MG/DL (ref 120–199)
CHOLEST/HDLC SERPL: 8 {RATIO} (ref 2–5)
CLARITY UR: CLEAR
CO2 SERPL-SCNC: 24 MMOL/L (ref 23–29)
COLOR UR: YELLOW
CREAT SERPL-MCNC: 1 MG/DL (ref 0.5–1.4)
CV STRESS BASE HR: 71 BPM
DIASTOLIC BLOOD PRESSURE: 45 MMHG
ERYTHROCYTE [DISTWIDTH] IN BLOOD BY AUTOMATED COUNT: 12.6 % (ref 11.5–14.5)
EST. GFR  (NO RACE VARIABLE): >60 ML/MIN/1.73 M^2
ESTIMATED AVG GLUCOSE: 108 MG/DL (ref 68–131)
GLUCOSE SERPL-MCNC: 101 MG/DL (ref 70–110)
GLUCOSE UR QL STRIP: NEGATIVE
HBA1C MFR BLD: 5.4 % (ref 4–5.6)
HCT VFR BLD AUTO: 45.1 % (ref 40–54)
HDLC SERPL-MCNC: 28 MG/DL (ref 40–75)
HDLC SERPL: 12.6 % (ref 20–50)
HGB BLD-MCNC: 15.3 G/DL (ref 14–18)
HGB UR QL STRIP: ABNORMAL
KETONES UR QL STRIP: NEGATIVE
LDLC SERPL CALC-MCNC: 153 MG/DL (ref 63–159)
LEUKOCYTE ESTERASE UR QL STRIP: NEGATIVE
MCH RBC QN AUTO: 30.2 PG (ref 27–31)
MCHC RBC AUTO-ENTMCNC: 33.9 G/DL (ref 32–36)
MCV RBC AUTO: 89 FL (ref 82–98)
NITRITE UR QL STRIP: NEGATIVE
NONHDLC SERPL-MCNC: 195 MG/DL
OHS CV CPX 1 MINUTE RECOVERY HEART RATE: 122 BPM
OHS CV CPX 85 PERCENT MAX PREDICTED HEART RATE MALE: 156
OHS CV CPX ESTIMATED METS: 15
OHS CV CPX MAX PREDICTED HEART RATE: 183
OHS CV CPX PATIENT IS FEMALE: 0
OHS CV CPX PATIENT IS MALE: 1
OHS CV CPX PEAK DIASTOLIC BLOOD PRESSURE: 65 MMHG
OHS CV CPX PEAK HEAR RATE: 166 BPM
OHS CV CPX PEAK RATE PRESSURE PRODUCT: NORMAL
OHS CV CPX PEAK SYSTOLIC BLOOD PRESSURE: 136 MMHG
OHS CV CPX PERCENT MAX PREDICTED HEART RATE ACHIEVED: 91
OHS CV CPX RATE PRESSURE PRODUCT PRESENTING: 6887
PH UR STRIP: 7 [PH] (ref 5–8)
PLATELET # BLD AUTO: 232 K/UL (ref 150–450)
PMV BLD AUTO: 9.4 FL (ref 9.2–12.9)
POTASSIUM SERPL-SCNC: 4.6 MMOL/L (ref 3.5–5.1)
PROT SERPL-MCNC: 7.1 G/DL (ref 6–8.4)
PROT UR QL STRIP: ABNORMAL
RBC # BLD AUTO: 5.07 M/UL (ref 4.6–6.2)
SODIUM SERPL-SCNC: 142 MMOL/L (ref 136–145)
SP GR UR STRIP: 1.02 (ref 1–1.03)
STRESS ECHO POST EXERCISE DUR MIN: 7 MINUTES
STRESS ECHO POST EXERCISE DUR SEC: 32 SECONDS
SYSTOLIC BLOOD PRESSURE: 97 MMHG
TRIGL SERPL-MCNC: 210 MG/DL (ref 30–150)
URN SPEC COLLECT METH UR: ABNORMAL
WBC # BLD AUTO: 6.64 K/UL (ref 3.9–12.7)

## 2023-04-11 PROCEDURE — 93016 CV STRESS TEST SUPVJ ONLY: CPT | Mod: ,,, | Performed by: INTERNAL MEDICINE

## 2023-04-11 PROCEDURE — 83036 HEMOGLOBIN GLYCOSYLATED A1C: CPT | Performed by: FAMILY MEDICINE

## 2023-04-11 PROCEDURE — 93018 CV STRESS TEST I&R ONLY: CPT | Mod: ,,, | Performed by: INTERNAL MEDICINE

## 2023-04-11 PROCEDURE — 93018 EXERCISE STRESS - EKG (CUPID ONLY): ICD-10-PCS | Mod: ,,, | Performed by: INTERNAL MEDICINE

## 2023-04-11 PROCEDURE — 85027 COMPLETE CBC AUTOMATED: CPT | Mod: PO | Performed by: FAMILY MEDICINE

## 2023-04-11 PROCEDURE — 93017 CV STRESS TEST TRACING ONLY: CPT | Mod: PO

## 2023-04-11 PROCEDURE — 81003 URINALYSIS AUTO W/O SCOPE: CPT | Mod: PO | Performed by: FAMILY MEDICINE

## 2023-04-11 PROCEDURE — 99199 UNLISTED SPECIAL SVC PX/RPRT: CPT | Mod: S$GLB,,, | Performed by: INTERNAL MEDICINE

## 2023-04-11 PROCEDURE — 99199 PR SPECIAL SERVICE/PROC/REPORT: ICD-10-PCS | Mod: S$GLB,,, | Performed by: INTERNAL MEDICINE

## 2023-04-11 PROCEDURE — 80061 LIPID PANEL: CPT | Performed by: FAMILY MEDICINE

## 2023-04-11 PROCEDURE — 80053 COMPREHEN METABOLIC PANEL: CPT | Mod: PO | Performed by: FAMILY MEDICINE

## 2023-04-11 PROCEDURE — 93016 EXERCISE STRESS - EKG (CUPID ONLY): ICD-10-PCS | Mod: ,,, | Performed by: INTERNAL MEDICINE

## 2023-04-18 ENCOUNTER — OFFICE VISIT (OUTPATIENT)
Dept: FAMILY MEDICINE | Facility: CLINIC | Age: 38
End: 2023-04-18
Attending: FAMILY MEDICINE
Payer: COMMERCIAL

## 2023-04-18 VITALS
SYSTOLIC BLOOD PRESSURE: 100 MMHG | BODY MASS INDEX: 28.8 KG/M2 | HEART RATE: 87 BPM | DIASTOLIC BLOOD PRESSURE: 76 MMHG | HEIGHT: 71 IN | OXYGEN SATURATION: 97 % | WEIGHT: 205.69 LBS

## 2023-04-18 DIAGNOSIS — G47.09 OTHER INSOMNIA: ICD-10-CM

## 2023-04-18 DIAGNOSIS — Z00.01 ANNUAL VISIT FOR GENERAL ADULT MEDICAL EXAMINATION WITH ABNORMAL FINDINGS: Primary | ICD-10-CM

## 2023-04-18 DIAGNOSIS — F41.9 ANXIETY: ICD-10-CM

## 2023-04-18 DIAGNOSIS — R31.29 OTHER MICROSCOPIC HEMATURIA: ICD-10-CM

## 2023-04-18 DIAGNOSIS — R80.8 OTHER PROTEINURIA: ICD-10-CM

## 2023-04-18 PROCEDURE — 1159F PR MEDICATION LIST DOCUMENTED IN MEDICAL RECORD: ICD-10-PCS | Mod: CPTII,S$GLB,, | Performed by: FAMILY MEDICINE

## 2023-04-18 PROCEDURE — 3078F DIAST BP <80 MM HG: CPT | Mod: CPTII,S$GLB,, | Performed by: FAMILY MEDICINE

## 2023-04-18 PROCEDURE — 3074F SYST BP LT 130 MM HG: CPT | Mod: CPTII,S$GLB,, | Performed by: FAMILY MEDICINE

## 2023-04-18 PROCEDURE — 99395 PR PREVENTIVE VISIT,EST,18-39: ICD-10-PCS | Mod: S$GLB,,, | Performed by: FAMILY MEDICINE

## 2023-04-18 PROCEDURE — 1159F MED LIST DOCD IN RCRD: CPT | Mod: CPTII,S$GLB,, | Performed by: FAMILY MEDICINE

## 2023-04-18 PROCEDURE — 99999 PR PBB SHADOW E&M-EST. PATIENT-LVL IV: CPT | Mod: PBBFAC,,, | Performed by: FAMILY MEDICINE

## 2023-04-18 PROCEDURE — 3044F PR MOST RECENT HEMOGLOBIN A1C LEVEL <7.0%: ICD-10-PCS | Mod: CPTII,S$GLB,, | Performed by: FAMILY MEDICINE

## 2023-04-18 PROCEDURE — 99999 PR PBB SHADOW E&M-EST. PATIENT-LVL IV: ICD-10-PCS | Mod: PBBFAC,,, | Performed by: FAMILY MEDICINE

## 2023-04-18 PROCEDURE — 3074F PR MOST RECENT SYSTOLIC BLOOD PRESSURE < 130 MM HG: ICD-10-PCS | Mod: CPTII,S$GLB,, | Performed by: FAMILY MEDICINE

## 2023-04-18 PROCEDURE — 3078F PR MOST RECENT DIASTOLIC BLOOD PRESSURE < 80 MM HG: ICD-10-PCS | Mod: CPTII,S$GLB,, | Performed by: FAMILY MEDICINE

## 2023-04-18 PROCEDURE — 3044F HG A1C LEVEL LT 7.0%: CPT | Mod: CPTII,S$GLB,, | Performed by: FAMILY MEDICINE

## 2023-04-18 PROCEDURE — 3008F BODY MASS INDEX DOCD: CPT | Mod: CPTII,S$GLB,, | Performed by: FAMILY MEDICINE

## 2023-04-18 PROCEDURE — 3008F PR BODY MASS INDEX (BMI) DOCUMENTED: ICD-10-PCS | Mod: CPTII,S$GLB,, | Performed by: FAMILY MEDICINE

## 2023-04-18 PROCEDURE — 99395 PREV VISIT EST AGE 18-39: CPT | Mod: S$GLB,,, | Performed by: FAMILY MEDICINE

## 2023-04-18 RX ORDER — ESCITALOPRAM OXALATE 5 MG/1
5 TABLET ORAL NIGHTLY
Qty: 30 TABLET | Refills: 11 | Status: SHIPPED | OUTPATIENT
Start: 2023-04-18 | End: 2023-06-01 | Stop reason: SINTOL

## 2023-04-18 RX ORDER — ESZOPICLONE 2 MG/1
2 TABLET, FILM COATED ORAL NIGHTLY
Qty: 30 TABLET | Refills: 0 | Status: SHIPPED | OUTPATIENT
Start: 2023-04-18 | End: 2023-05-20 | Stop reason: SDUPTHER

## 2023-04-19 NOTE — PROGRESS NOTES
April 19, 2023                                                                                                                                                                                                                                                                                      Daron Lea  [address] [facility] [address]                                                                                                                                                                                                                                                                                                RE: Daron Lea                                                        Clinic #:[id number]                                                                                                                                   Dear  Daron Lea,                                                                                                                                           Thank you for allowing me to serve you and perform your Executive Health exam on April 19, 2023.   This letter will serve a brief summary of the history, physical findings, and laboratory/studies performed and recommendations at that time.                                                                                         REASON FOR VISIT: Executive Health Preventive Physical Examination    Past Medical History:   Diagnosis Date    Allergic rhinitis     History of ear infections     Hyperlipidemia        Past Surgical History:   Procedure Laterality Date    ANTERIOR CRUCIATE LIGAMENT REPAIR Left     SHOULDER ARTHROSCOPY W/ LABRAL REPAIR Right     VASECTOMY         Family History   Problem Relation Age of Onset    Breast cancer Mother     Colon cancer Neg Hx        Social History     Socioeconomic History    Marital status:    Tobacco Use    Smoking status: Never    Smokeless tobacco: Never   Substance  and Sexual Activity    Alcohol use: Yes     Comment: Occasional    Drug use: Never    Sexual activity: Yes     Partners: Female     Comment:        Allergies: Patient has no known allergies.    Current Outpatient Medications   Medication Sig Dispense Refill    loratadine (CLARITIN) 10 mg tablet Take 10 mg by mouth once daily.      montelukast (SINGULAIR) 10 mg tablet Take 10 mg by mouth every evening.      EScitalopram oxalate (LEXAPRO) 5 MG Tab Take 1 tablet (5 mg total) by mouth nightly. 30 tablet 11    eszopiclone (LUNESTA) 2 MG Tab Take 1 tablet (2 mg total) by mouth every evening. 30 tablet 0     No current facility-administered medications for this visit.     The patient here today for executive physical examination.  The patient stated that is been having problems with symptoms of insomnia and anxiety since his coughing passed away with a heart attack at 40 years old.  The patient is been not able to sleep at night and he feels anxious.  He is currently not taking medications for this problem.  The patient had a urinalysis that showed presence of proteinuria and trace of blood in the urine.  He is cholesterol levels were elevated, HDL levels were low, LDL levels were elevated and also triglycerides.  The patient is currently not taking any cholesterol medication.  His glucose levels were on the borderline area 101.  REVIEW OF SYSTEMS:  No recent changes in weight, or complaints of fatigue. No recent changes in vision, or hearing. Denies frequent headaches.No recent changes in voice. No new or changing skin lesions. Denies abnormal bruising or bleeding.  Denies chest pain or sensation of skipped beats. No new onset of shortness of breath, or dyspnea on exertion. Denies abdominal discomfort, constipation, diarrhea,or blood in stool. Denies difficulty with urination.   No recent joint swelling or muscle discomfort. Denies pain or weakness in extremeties. No recent loss of balance. Denies problems with  "sleep or depression.        Remainder of the review of systems without pertinent positves at this time.                                                                              PHYSICAL EXAM:   VITAL SIGNS: /76   Pulse 87   Ht 5' 11" (1.803 m)   Wt 93.3 kg (205 lb 11 oz)   SpO2 97%   BMI 28.69 kg/m²   GENERAL APPEARANCE:  Well nourished and normally developed,  pleasant 37 y.o. male, in good spirits.  SKIN: Without rashes or overt lesions.  HEENT: Head normacephalic. There was no scleral icterus. Mucous membranes were moist. Dentition. Neck is supple, no thyromegally, or carotid bruits.  LUNGS: Clear to auscultation bilaterally. Normal respiratory effort.  HEART: Exam reveals regular rate and rhythm. First and second heart sounds normal. No murmurs, rubs or gallops.   ABDOMEN: Soft, non-tender, non-distended. Exam reveals normal bowl sounds, no masses, no organomegaly and no aortic enlargement.    EXTREMITIES:  Nonedematous, both femoral and pedal pulses are normal. No joint stiffness or tenderness. Full range of motion and strength, upper and lower bilaterally.    LAB DATA/STUDIES REVIEWED:  LABS:  Increased LDL, increase triglycerides, decreased HDL levels, mild elevation of the glucose  Stress test was negative for ischemia.    ASSESSMENT/RECOMMENDATIONS :    Annual visit for general adult medical examination with abnormal findings    Other proteinuria:  New problem workup needed    Other microscopic hematuria: New problem workup needed  -     Urinalysis; Future; Expected date: 04/18/2023  -     Urinalysis Microscopic; Future; Expected date: 04/18/2023  -     Urine culture; Future; Expected date: 04/18/2023    Other insomnia: New problem workup needed  -     eszopiclone (LUNESTA) 2 MG Tab; Take 1 tablet (2 mg total) by mouth every evening.  Dispense: 30 tablet; Refill: 0    Anxiety:  New problem, no further workup needed  -     EScitalopram oxalate (LEXAPRO) 5 MG Tab; Take 1 tablet (5 mg total) by " mouth nightly.  Dispense: 30 tablet; Refill: 11         For the anxiety and insomnia, I will recommend to start taking Lexapro 5 mg at bedtime, will start patient also on Lunesta were insomnia 2 mg at bedtime.  [place] prescription monitoring program was checked and okay.  Continue to work on regular exercise, maintenance of a healthy weight, balanced diet rich in fruits/vegetables and lean protein, and avoidance of unhealthy habits like smoking and excessive alcohol intake.    Please contact me should you have any questions or concerns regarding physical findings, or my recommendations.       Sincerely yours,        Tiana Hernandez M.D.  Department of Family Practice  [facility]

## 2023-05-11 ENCOUNTER — TELEPHONE (OUTPATIENT)
Dept: FAMILY MEDICINE | Facility: CLINIC | Age: 38
End: 2023-05-11
Payer: COMMERCIAL

## 2023-05-11 ENCOUNTER — PATIENT MESSAGE (OUTPATIENT)
Dept: FAMILY MEDICINE | Facility: CLINIC | Age: 38
End: 2023-05-11
Payer: COMMERCIAL

## 2023-05-11 NOTE — TELEPHONE ENCOUNTER
----- Message from Sarah López sent at 5/11/2023  8:44 AM CDT -----  Contact: pt  Type:  Needs Medical Advice    Who Called: Pt  Would the patient rather a call back or a response via [facility]? call  Best Call Back Number: [phone]  Additional Information: Pt states that he need a callback as soon as possible. States that he need clarification on when he should bring his urine sample back in. Please advise thank you

## 2023-05-15 ENCOUNTER — LAB VISIT (OUTPATIENT)
Dept: LAB | Facility: HOSPITAL | Age: 38
End: 2023-05-15
Attending: FAMILY MEDICINE
Payer: COMMERCIAL

## 2023-05-15 DIAGNOSIS — R31.29 OTHER MICROSCOPIC HEMATURIA: ICD-10-CM

## 2023-05-15 LAB
AMORPH CRY URNS QL MICRO: ABNORMAL
BACTERIA #/AREA URNS HPF: ABNORMAL /HPF
BILIRUB UR QL STRIP: NEGATIVE
CLARITY UR: ABNORMAL
COLOR UR: YELLOW
GLUCOSE UR QL STRIP: NEGATIVE
HGB UR QL STRIP: NEGATIVE
KETONES UR QL STRIP: NEGATIVE
LEUKOCYTE ESTERASE UR QL STRIP: NEGATIVE
MICROSCOPIC COMMENT: ABNORMAL
NITRITE UR QL STRIP: NEGATIVE
PH UR STRIP: 7 [PH] (ref 5–8)
PROT UR QL STRIP: NEGATIVE
SP GR UR STRIP: 1.01 (ref 1–1.03)
SQUAMOUS #/AREA URNS HPF: 1 /HPF
URN SPEC COLLECT METH UR: ABNORMAL

## 2023-05-15 PROCEDURE — 87086 URINE CULTURE/COLONY COUNT: CPT | Performed by: FAMILY MEDICINE

## 2023-05-15 PROCEDURE — 81000 URINALYSIS NONAUTO W/SCOPE: CPT | Mod: PO | Performed by: FAMILY MEDICINE

## 2023-05-16 LAB — BACTERIA UR CULT: NO GROWTH

## 2023-05-20 DIAGNOSIS — G47.09 OTHER INSOMNIA: ICD-10-CM

## 2023-05-20 RX ORDER — ESZOPICLONE 2 MG/1
2 TABLET, FILM COATED ORAL NIGHTLY
Qty: 30 TABLET | Refills: 2 | Status: SHIPPED | OUTPATIENT
Start: 2023-05-20 | End: 2023-07-31

## 2023-05-20 NOTE — TELEPHONE ENCOUNTER
No care due was identified.  [facility] Embedded Care Due Messages. Reference number: [id number].   5/20/2023 9:05:29 AM CDT

## 2023-06-01 ENCOUNTER — LAB VISIT (OUTPATIENT)
Dept: LAB | Facility: HOSPITAL | Age: 38
End: 2023-06-01
Attending: FAMILY MEDICINE
Payer: COMMERCIAL

## 2023-06-01 ENCOUNTER — PATIENT MESSAGE (OUTPATIENT)
Dept: FAMILY MEDICINE | Facility: CLINIC | Age: 38
End: 2023-06-01

## 2023-06-01 ENCOUNTER — OFFICE VISIT (OUTPATIENT)
Dept: FAMILY MEDICINE | Facility: CLINIC | Age: 38
End: 2023-06-01
Payer: COMMERCIAL

## 2023-06-01 VITALS
WEIGHT: 206.88 LBS | BODY MASS INDEX: 28.96 KG/M2 | DIASTOLIC BLOOD PRESSURE: 74 MMHG | HEIGHT: 71 IN | HEART RATE: 70 BPM | SYSTOLIC BLOOD PRESSURE: 114 MMHG | OXYGEN SATURATION: 97 %

## 2023-06-01 DIAGNOSIS — R35.0 INCREASED FREQUENCY OF URINATION: ICD-10-CM

## 2023-06-01 DIAGNOSIS — G47.09 OTHER INSOMNIA: Primary | ICD-10-CM

## 2023-06-01 DIAGNOSIS — R73.09 ABNORMAL GLUCOSE: ICD-10-CM

## 2023-06-01 DIAGNOSIS — F41.9 ANXIETY: ICD-10-CM

## 2023-06-01 DIAGNOSIS — E78.49 OTHER HYPERLIPIDEMIA: ICD-10-CM

## 2023-06-01 LAB
BILIRUB UR QL STRIP: NEGATIVE
CLARITY UR: CLEAR
COLOR UR: YELLOW
GLUCOSE UR QL STRIP: NEGATIVE
HGB UR QL STRIP: NEGATIVE
KETONES UR QL STRIP: NEGATIVE
LEUKOCYTE ESTERASE UR QL STRIP: NEGATIVE
NITRITE UR QL STRIP: NEGATIVE
PH UR STRIP: 6 [PH] (ref 5–8)
PROT UR QL STRIP: NEGATIVE
SP GR UR STRIP: 1.01 (ref 1–1.03)
URN SPEC COLLECT METH UR: NORMAL

## 2023-06-01 PROCEDURE — 3008F PR BODY MASS INDEX (BMI) DOCUMENTED: ICD-10-PCS | Mod: CPTII,S$GLB,, | Performed by: FAMILY MEDICINE

## 2023-06-01 PROCEDURE — 1159F PR MEDICATION LIST DOCUMENTED IN MEDICAL RECORD: ICD-10-PCS | Mod: CPTII,S$GLB,, | Performed by: FAMILY MEDICINE

## 2023-06-01 PROCEDURE — 3078F DIAST BP <80 MM HG: CPT | Mod: CPTII,S$GLB,, | Performed by: FAMILY MEDICINE

## 2023-06-01 PROCEDURE — 3044F PR MOST RECENT HEMOGLOBIN A1C LEVEL <7.0%: ICD-10-PCS | Mod: CPTII,S$GLB,, | Performed by: FAMILY MEDICINE

## 2023-06-01 PROCEDURE — 3044F HG A1C LEVEL LT 7.0%: CPT | Mod: CPTII,S$GLB,, | Performed by: FAMILY MEDICINE

## 2023-06-01 PROCEDURE — 81003 URINALYSIS AUTO W/O SCOPE: CPT | Mod: PO | Performed by: FAMILY MEDICINE

## 2023-06-01 PROCEDURE — 3008F BODY MASS INDEX DOCD: CPT | Mod: CPTII,S$GLB,, | Performed by: FAMILY MEDICINE

## 2023-06-01 PROCEDURE — 3078F PR MOST RECENT DIASTOLIC BLOOD PRESSURE < 80 MM HG: ICD-10-PCS | Mod: CPTII,S$GLB,, | Performed by: FAMILY MEDICINE

## 2023-06-01 PROCEDURE — 3074F PR MOST RECENT SYSTOLIC BLOOD PRESSURE < 130 MM HG: ICD-10-PCS | Mod: CPTII,S$GLB,, | Performed by: FAMILY MEDICINE

## 2023-06-01 PROCEDURE — 99999 PR PBB SHADOW E&M-EST. PATIENT-LVL III: ICD-10-PCS | Mod: PBBFAC,,, | Performed by: FAMILY MEDICINE

## 2023-06-01 PROCEDURE — 99999 PR PBB SHADOW E&M-EST. PATIENT-LVL III: CPT | Mod: PBBFAC,,, | Performed by: FAMILY MEDICINE

## 2023-06-01 PROCEDURE — 99214 PR OFFICE/OUTPT VISIT, EST, LEVL IV, 30-39 MIN: ICD-10-PCS | Mod: S$GLB,,, | Performed by: FAMILY MEDICINE

## 2023-06-01 PROCEDURE — 1159F MED LIST DOCD IN RCRD: CPT | Mod: CPTII,S$GLB,, | Performed by: FAMILY MEDICINE

## 2023-06-01 PROCEDURE — 3074F SYST BP LT 130 MM HG: CPT | Mod: CPTII,S$GLB,, | Performed by: FAMILY MEDICINE

## 2023-06-01 PROCEDURE — 99214 OFFICE O/P EST MOD 30 MIN: CPT | Mod: S$GLB,,, | Performed by: FAMILY MEDICINE

## 2023-06-05 RX ORDER — LORAZEPAM 0.5 MG/1
0.5 TABLET ORAL DAILY PRN
Qty: 10 TABLET | Refills: 0 | Status: SHIPPED | OUTPATIENT
Start: 2023-06-05 | End: 2023-08-08

## 2023-06-05 NOTE — PROGRESS NOTES
Assessment:       1. Other insomnia    2. Increased frequency of urination    3. Abnormal glucose    4. Other hyperlipidemia    5. Anxiety        Plan:       Other insomnia:  Improved    Increased frequency of urination:  Improved  -     Urinalysis; Future; Expected date: 06/01/2023    Abnormal glucose:  Worsening  -     Insulin, Random; Future; Expected date: 06/01/2023  -     Hemoglobin A1C; Future; Expected date: 06/01/2023    Other hyperlipidemia:  Uncontrolled  -     Comprehensive Metabolic Panel; Future; Expected date: 06/01/2023  -     Lipid Panel; Future; Expected date: 06/01/2023  -     APOLIPOPROTEIN A1 AND B; Future; Expected date: 06/01/2023  -     CRP, High Sensitivity; Future; Expected date: 06/01/2023    Anxiety:  Stable         [place] prescription monitoring program was checked and okay.  The patient will contact us when he needs a prescription for Lunesta.  Healthy habits, avoid processed foods, processed starches, eat more vegetables,  drink more water.  The patient's BMI has been recorded in the chart. The patient has been provided educational materials regarding the benefits of attaining and maintaining a normal weight. We will continue to address and follow this issue during follow up visits.   Patient agreed with assessment and plan. Patient verbalized understanding.     Subjective:       Patient ID: Daron Lea is a 37 y.o. male.    Chief Complaint: routine check up    HPI    Insomnia:  The patient was prescribed Lunesta, seemed to help a lot, the patient stated that he has not been taking the medication for a while because he was doing better after went on vacation, he denies any side effects of the medication.  He also stated sometimes he is having problems with anxiety when goes in the plane.  He also stop taking Lexapro because stated that after stop taking the medication for a few days, did not notice any new changes as he started to feel better.    Hyperlipidemia:  The last  cholesterol levels showed the triglycerides were elevated, the HDL levels were low, the LDL levels were high.  The ratios were elevated.  The patient is currently not taking any cholesterol medication.  Upon review of the last blood work, the patient has abnormal glucose levels.    Increased frequency:  The last urine test showed presence of few bacteria, the patient stated that is not having any dysuria, hematuria, he would like to repeat the urine to make sure everything is okay.    Past medical history, past social history was reviewed and discussed with the patient.    Review of Systems   Constitutional:  Negative for activity change, appetite change and chills.   HENT:  Negative for congestion and ear discharge.    Eyes:  Negative for discharge and itching.   Respiratory:  Negative for choking and chest tightness.    Cardiovascular:  Negative for chest pain, palpitations and leg swelling.   Gastrointestinal:  Negative for abdominal distention, abdominal pain and constipation.   Endocrine: Negative for cold intolerance and heat intolerance.   Genitourinary:  Positive for frequency. Negative for dysuria and flank pain.   Musculoskeletal:  Negative for arthralgias and back pain.   Skin:  Negative for pallor and rash.   Allergic/Immunologic: Negative for environmental allergies and food allergies.   Neurological:  Negative for dizziness, facial asymmetry and headaches.   Hematological:  Negative for adenopathy. Does not bruise/bleed easily.   Psychiatric/Behavioral:  Positive for sleep disturbance. Negative for agitation, confusion and decreased concentration. The patient is nervous/anxious.      Objective:      Physical Exam  Vitals and nursing note reviewed.   Constitutional:       General: He is not in acute distress.     Appearance: Normal appearance. He is well-developed. He is not diaphoretic.   HENT:      Head: Normocephalic and atraumatic.      Right Ear: External ear normal.      Left Ear: External ear  normal.      Nose: Nose normal.   Eyes:      General: No scleral icterus.        Left eye: No discharge.   Cardiovascular:      Rate and Rhythm: Normal rate and regular rhythm.      Heart sounds: Normal heart sounds.   Pulmonary:      Effort: Pulmonary effort is normal. No respiratory distress.      Breath sounds: Normal breath sounds. No wheezing.   Musculoskeletal:      Cervical back: Normal range of motion and neck supple.   Skin:     General: Skin is warm and dry.      Coloration: Skin is not pale.      Findings: No erythema.   Neurological:      Mental Status: He is alert.      Motor: No abnormal muscle tone.   Psychiatric:         Behavior: Behavior normal.         Thought Content: Thought content normal.         Judgment: Judgment normal.

## 2023-07-10 ENCOUNTER — PATIENT MESSAGE (OUTPATIENT)
Dept: FAMILY MEDICINE | Facility: CLINIC | Age: 38
End: 2023-07-10
Payer: COMMERCIAL

## 2023-07-11 ENCOUNTER — PATIENT MESSAGE (OUTPATIENT)
Dept: FAMILY MEDICINE | Facility: CLINIC | Age: 38
End: 2023-07-11
Payer: COMMERCIAL

## 2023-07-11 RX ORDER — ZOLPIDEM TARTRATE 5 MG/1
5 TABLET ORAL NIGHTLY PRN
Qty: 30 TABLET | Refills: 0 | Status: SHIPPED | OUTPATIENT
Start: 2023-07-11 | End: 2023-07-31

## 2023-07-11 RX ORDER — BUSPIRONE HYDROCHLORIDE 5 MG/1
5 TABLET ORAL 2 TIMES DAILY
Qty: 60 TABLET | Refills: 0 | Status: SHIPPED | OUTPATIENT
Start: 2023-07-11 | End: 2023-08-08

## 2023-07-31 ENCOUNTER — PATIENT MESSAGE (OUTPATIENT)
Dept: FAMILY MEDICINE | Facility: CLINIC | Age: 38
End: 2023-07-31
Payer: COMMERCIAL

## 2023-07-31 DIAGNOSIS — G47.09 OTHER INSOMNIA: ICD-10-CM

## 2023-07-31 RX ORDER — ESZOPICLONE 2 MG/1
2 TABLET, FILM COATED ORAL NIGHTLY PRN
Qty: 30 TABLET | Refills: 2 | Status: SHIPPED | OUTPATIENT
Start: 2023-07-31 | End: 2023-08-22 | Stop reason: RX

## 2023-08-08 ENCOUNTER — PATIENT MESSAGE (OUTPATIENT)
Dept: FAMILY MEDICINE | Facility: CLINIC | Age: 38
End: 2023-08-08
Payer: COMMERCIAL

## 2023-08-08 RX ORDER — BUSPIRONE HYDROCHLORIDE 5 MG/1
TABLET ORAL
Qty: 60 TABLET | Refills: 4 | Status: SHIPPED | OUTPATIENT
Start: 2023-08-08 | End: 2024-01-13

## 2023-08-08 RX ORDER — LORAZEPAM 0.5 MG/1
0.5 TABLET ORAL DAILY PRN
Qty: 10 TABLET | Refills: 0 | Status: SHIPPED | OUTPATIENT
Start: 2023-08-08 | End: 2023-12-04

## 2023-08-08 NOTE — TELEPHONE ENCOUNTER
No care due was identified.  [facility] Embedded Care Due Messages. Reference number: [id number].   8/08/2023 9:05:16 AM CDT

## 2023-08-08 NOTE — TELEPHONE ENCOUNTER
Dr. Hernandez, This patient was seen back in June but was not seen for a wellness. Would he still need an appointment?

## 2023-08-17 ENCOUNTER — PATIENT MESSAGE (OUTPATIENT)
Dept: FAMILY MEDICINE | Facility: CLINIC | Age: 38
End: 2023-08-17
Payer: COMMERCIAL

## 2023-08-18 RX ORDER — SCOLOPAMINE TRANSDERMAL SYSTEM 1 MG/1
1 PATCH, EXTENDED RELEASE TRANSDERMAL
Qty: 10 PATCH | Refills: 0 | Status: SHIPPED | OUTPATIENT
Start: 2023-08-18 | End: 2023-12-04

## 2023-08-19 ENCOUNTER — PATIENT MESSAGE (OUTPATIENT)
Dept: FAMILY MEDICINE | Facility: CLINIC | Age: 38
End: 2023-08-19
Payer: COMMERCIAL

## 2023-08-19 DIAGNOSIS — F41.9 ANXIETY: ICD-10-CM

## 2023-08-19 DIAGNOSIS — R45.86 MOOD SWINGS: Primary | ICD-10-CM

## 2023-08-19 DIAGNOSIS — Z91.89 LACK OF MOTIVATION: ICD-10-CM

## 2023-08-21 ENCOUNTER — TELEPHONE (OUTPATIENT)
Dept: FAMILY MEDICINE | Facility: CLINIC | Age: 38
End: 2023-08-21
Payer: COMMERCIAL

## 2023-08-21 NOTE — TELEPHONE ENCOUNTER
Pharmacy states they do not have the RX eszopiclone (LUNESTA) 2 MG Tab but they can get it in 1 mg or 3 mg, please advise.

## 2023-08-21 NOTE — TELEPHONE ENCOUNTER
----- Message from Clint Jamison sent at 8/21/2023 11:46 AM CDT -----  Type: Needs Medical Advice  Who Called:  [facility]  Best Call Back Number: [phone]  Additional Information: Caller states they do not have the RX eszopiclone (LUNESTA) 2 MG Tab but they can get it in 1 mg or 3 mg, pl call bk to advise thanks

## 2023-08-22 ENCOUNTER — PATIENT MESSAGE (OUTPATIENT)
Dept: FAMILY MEDICINE | Facility: CLINIC | Age: 38
End: 2023-08-22
Payer: COMMERCIAL

## 2023-08-22 RX ORDER — ESZOPICLONE 1 MG/1
2 TABLET, FILM COATED ORAL NIGHTLY
Qty: 60 TABLET | Refills: 2 | Status: SHIPPED | OUTPATIENT
Start: 2023-08-22 | End: 2023-08-22 | Stop reason: CLARIF

## 2023-08-22 RX ORDER — ESZOPICLONE 1 MG/1
2 TABLET, FILM COATED ORAL NIGHTLY
Qty: 60 TABLET | Refills: 2 | Status: SHIPPED | OUTPATIENT
Start: 2023-08-22 | End: 2023-12-04 | Stop reason: DRUGHIGH

## 2023-09-05 ENCOUNTER — PATIENT MESSAGE (OUTPATIENT)
Dept: FAMILY MEDICINE | Facility: CLINIC | Age: 38
End: 2023-09-05
Payer: COMMERCIAL

## 2023-09-06 ENCOUNTER — LAB VISIT (OUTPATIENT)
Dept: LAB | Facility: HOSPITAL | Age: 38
End: 2023-09-06
Attending: FAMILY MEDICINE
Payer: COMMERCIAL

## 2023-09-06 DIAGNOSIS — F41.9 ANXIETY: ICD-10-CM

## 2023-09-06 DIAGNOSIS — R45.86 MOOD SWINGS: ICD-10-CM

## 2023-09-06 DIAGNOSIS — Z91.89 LACK OF MOTIVATION: ICD-10-CM

## 2023-09-06 LAB — TESTOST SERPL-MCNC: 589 NG/DL (ref 304–1227)

## 2023-09-06 PROCEDURE — 84403 ASSAY OF TOTAL TESTOSTERONE: CPT | Performed by: FAMILY MEDICINE

## 2023-09-06 PROCEDURE — 36415 COLL VENOUS BLD VENIPUNCTURE: CPT | Mod: PO | Performed by: FAMILY MEDICINE

## 2023-11-08 ENCOUNTER — PATIENT MESSAGE (OUTPATIENT)
Dept: FAMILY MEDICINE | Facility: CLINIC | Age: 38
End: 2023-11-08
Payer: COMMERCIAL

## 2023-11-13 ENCOUNTER — TELEPHONE (OUTPATIENT)
Dept: FAMILY MEDICINE | Facility: CLINIC | Age: 38
End: 2023-11-13
Payer: COMMERCIAL

## 2023-11-14 NOTE — TELEPHONE ENCOUNTER
Spoke w/ pt. He has been seeing Dr Hernandez for Meetyl and some other concerns. Has f/u on 12/4/23 w/ her. Asked if pt would like to change to Dr Carpenter. Pt opted to keep w/ Dr Hernandez as her office is more convenient. Pt will call back if he decides to bhange provider.

## 2023-11-27 ENCOUNTER — TELEPHONE (OUTPATIENT)
Dept: FAMILY MEDICINE | Facility: CLINIC | Age: 38
End: 2023-11-27
Payer: COMMERCIAL

## 2023-11-27 DIAGNOSIS — G47.09 OTHER INSOMNIA: ICD-10-CM

## 2023-11-27 RX ORDER — ESZOPICLONE 2 MG/1
2 TABLET, FILM COATED ORAL NIGHTLY
Qty: 30 TABLET | Refills: 0 | Status: SHIPPED | OUTPATIENT
Start: 2023-11-27 | End: 2023-12-04 | Stop reason: SDUPTHER

## 2023-11-27 NOTE — TELEPHONE ENCOUNTER
Spoke w/ pt. Lab resched for 12/4/23 at 850am. Pt missed lab las week and just needed to resched. First appt w/ Dr Carpenter is not until after 1/1/23. He will keep appt w/ Dr Hernandez at this time.

## 2023-11-27 NOTE — TELEPHONE ENCOUNTER
----- Message from Josie Crawley sent at 11/27/2023  1:34 PM CST -----  Regarding: Needs Medical Advice  Contact: patient at [phone]  Type: Needs Medical Advice  Who Called:  patient at [phone]    Additional Information: Is calling to follow up on discussion about changing provider to Dr. Carpenter.  .Please call and advise. Thank you

## 2023-11-27 NOTE — TELEPHONE ENCOUNTER
No care due was identified.  [facility] Embedded Care Due Messages. Reference number: [id number].   11/27/2023 1:48:31 PM CST

## 2023-12-04 ENCOUNTER — OFFICE VISIT (OUTPATIENT)
Dept: FAMILY MEDICINE | Facility: CLINIC | Age: 38
End: 2023-12-04
Payer: COMMERCIAL

## 2023-12-04 ENCOUNTER — LAB VISIT (OUTPATIENT)
Dept: LAB | Facility: HOSPITAL | Age: 38
End: 2023-12-04
Attending: FAMILY MEDICINE
Payer: COMMERCIAL

## 2023-12-04 VITALS
DIASTOLIC BLOOD PRESSURE: 70 MMHG | BODY MASS INDEX: 29.97 KG/M2 | HEART RATE: 104 BPM | WEIGHT: 214.06 LBS | SYSTOLIC BLOOD PRESSURE: 102 MMHG | OXYGEN SATURATION: 96 % | HEIGHT: 71 IN

## 2023-12-04 DIAGNOSIS — E78.49 OTHER HYPERLIPIDEMIA: ICD-10-CM

## 2023-12-04 DIAGNOSIS — G47.09 OTHER INSOMNIA: ICD-10-CM

## 2023-12-04 DIAGNOSIS — Z00.01 ANNUAL VISIT FOR GENERAL ADULT MEDICAL EXAMINATION WITH ABNORMAL FINDINGS: ICD-10-CM

## 2023-12-04 DIAGNOSIS — R73.09 ABNORMAL GLUCOSE: ICD-10-CM

## 2023-12-04 DIAGNOSIS — F41.9 ANXIETY: ICD-10-CM

## 2023-12-04 DIAGNOSIS — J02.9 SORE THROAT: ICD-10-CM

## 2023-12-04 DIAGNOSIS — E78.49 OTHER HYPERLIPIDEMIA: Primary | ICD-10-CM

## 2023-12-04 DIAGNOSIS — E78.2 MIXED HYPERLIPIDEMIA: ICD-10-CM

## 2023-12-04 LAB
ALBUMIN SERPL BCP-MCNC: 4.3 G/DL (ref 3.5–5.2)
ALP SERPL-CCNC: 79 U/L (ref 55–135)
ALT SERPL W/O P-5'-P-CCNC: 42 U/L (ref 10–44)
ANION GAP SERPL CALC-SCNC: 12 MMOL/L (ref 8–16)
AST SERPL-CCNC: 24 U/L (ref 10–40)
BILIRUB SERPL-MCNC: 1.1 MG/DL (ref 0.1–1)
BUN SERPL-MCNC: 16 MG/DL (ref 6–20)
CALCIUM SERPL-MCNC: 10.1 MG/DL (ref 8.7–10.5)
CHLORIDE SERPL-SCNC: 103 MMOL/L (ref 95–110)
CHOLEST SERPL-MCNC: 280 MG/DL (ref 120–199)
CHOLEST/HDLC SERPL: 7.4 {RATIO} (ref 2–5)
CO2 SERPL-SCNC: 24 MMOL/L (ref 23–29)
CREAT SERPL-MCNC: 1.1 MG/DL (ref 0.5–1.4)
CRP SERPL-MCNC: 2.28 MG/L (ref 0–3.19)
CTP QC/QA: YES
EST. GFR  (NO RACE VARIABLE): >60 ML/MIN/1.73 M^2
ESTIMATED AVG GLUCOSE: 103 MG/DL (ref 68–131)
GLUCOSE SERPL-MCNC: 91 MG/DL (ref 70–110)
HBA1C MFR BLD: 5.2 % (ref 4–5.6)
HDLC SERPL-MCNC: 38 MG/DL (ref 40–75)
HDLC SERPL: 13.6 % (ref 20–50)
INSULIN COLLECTION INTERVAL: NORMAL
INSULIN SERPL-ACNC: 5.8 UU/ML
LDLC SERPL CALC-MCNC: ABNORMAL MG/DL (ref 63–159)
NONHDLC SERPL-MCNC: 242 MG/DL
POTASSIUM SERPL-SCNC: 4.5 MMOL/L (ref 3.5–5.1)
PROT SERPL-MCNC: 7.9 G/DL (ref 6–8.4)
S PYO RRNA THROAT QL PROBE: NEGATIVE
SODIUM SERPL-SCNC: 139 MMOL/L (ref 136–145)
TRIGL SERPL-MCNC: 409 MG/DL (ref 30–150)

## 2023-12-04 PROCEDURE — 36415 COLL VENOUS BLD VENIPUNCTURE: CPT | Mod: PN | Performed by: FAMILY MEDICINE

## 2023-12-04 PROCEDURE — 1159F MED LIST DOCD IN RCRD: CPT | Mod: CPTII,S$GLB,, | Performed by: FAMILY MEDICINE

## 2023-12-04 PROCEDURE — 99999 PR PBB SHADOW E&M-EST. PATIENT-LVL III: CPT | Mod: PBBFAC,,, | Performed by: FAMILY MEDICINE

## 2023-12-04 PROCEDURE — 3078F DIAST BP <80 MM HG: CPT | Mod: CPTII,S$GLB,, | Performed by: FAMILY MEDICINE

## 2023-12-04 PROCEDURE — 99999 PR PBB SHADOW E&M-EST. PATIENT-LVL III: ICD-10-PCS | Mod: PBBFAC,,, | Performed by: FAMILY MEDICINE

## 2023-12-04 PROCEDURE — 87880 POCT RAPID STREP A: ICD-10-PCS | Mod: QW,S$GLB,, | Performed by: FAMILY MEDICINE

## 2023-12-04 PROCEDURE — 1160F RVW MEDS BY RX/DR IN RCRD: CPT | Mod: CPTII,S$GLB,, | Performed by: FAMILY MEDICINE

## 2023-12-04 PROCEDURE — 3008F PR BODY MASS INDEX (BMI) DOCUMENTED: ICD-10-PCS | Mod: CPTII,S$GLB,, | Performed by: FAMILY MEDICINE

## 2023-12-04 PROCEDURE — 1159F PR MEDICATION LIST DOCUMENTED IN MEDICAL RECORD: ICD-10-PCS | Mod: CPTII,S$GLB,, | Performed by: FAMILY MEDICINE

## 2023-12-04 PROCEDURE — 3074F PR MOST RECENT SYSTOLIC BLOOD PRESSURE < 130 MM HG: ICD-10-PCS | Mod: CPTII,S$GLB,, | Performed by: FAMILY MEDICINE

## 2023-12-04 PROCEDURE — 1160F PR REVIEW ALL MEDS BY PRESCRIBER/CLIN PHARMACIST DOCUMENTED: ICD-10-PCS | Mod: CPTII,S$GLB,, | Performed by: FAMILY MEDICINE

## 2023-12-04 PROCEDURE — 3044F HG A1C LEVEL LT 7.0%: CPT | Mod: CPTII,S$GLB,, | Performed by: FAMILY MEDICINE

## 2023-12-04 PROCEDURE — 3074F SYST BP LT 130 MM HG: CPT | Mod: CPTII,S$GLB,, | Performed by: FAMILY MEDICINE

## 2023-12-04 PROCEDURE — 3044F PR MOST RECENT HEMOGLOBIN A1C LEVEL <7.0%: ICD-10-PCS | Mod: CPTII,S$GLB,, | Performed by: FAMILY MEDICINE

## 2023-12-04 PROCEDURE — 99214 OFFICE O/P EST MOD 30 MIN: CPT | Mod: 25,S$GLB,, | Performed by: FAMILY MEDICINE

## 2023-12-04 PROCEDURE — 99214 PR OFFICE/OUTPT VISIT, EST, LEVL IV, 30-39 MIN: ICD-10-PCS | Mod: 25,S$GLB,, | Performed by: FAMILY MEDICINE

## 2023-12-04 PROCEDURE — 83525 ASSAY OF INSULIN: CPT | Performed by: FAMILY MEDICINE

## 2023-12-04 PROCEDURE — 3078F PR MOST RECENT DIASTOLIC BLOOD PRESSURE < 80 MM HG: ICD-10-PCS | Mod: CPTII,S$GLB,, | Performed by: FAMILY MEDICINE

## 2023-12-04 PROCEDURE — 83036 HEMOGLOBIN GLYCOSYLATED A1C: CPT | Performed by: FAMILY MEDICINE

## 2023-12-04 PROCEDURE — 87880 STREP A ASSAY W/OPTIC: CPT | Mod: QW,S$GLB,, | Performed by: FAMILY MEDICINE

## 2023-12-04 PROCEDURE — 3008F BODY MASS INDEX DOCD: CPT | Mod: CPTII,S$GLB,, | Performed by: FAMILY MEDICINE

## 2023-12-04 PROCEDURE — 82172 ASSAY OF APOLIPOPROTEIN: CPT | Mod: 91 | Performed by: FAMILY MEDICINE

## 2023-12-04 PROCEDURE — 80053 COMPREHEN METABOLIC PANEL: CPT | Performed by: FAMILY MEDICINE

## 2023-12-04 PROCEDURE — 80061 LIPID PANEL: CPT | Performed by: FAMILY MEDICINE

## 2023-12-04 PROCEDURE — 86141 C-REACTIVE PROTEIN HS: CPT | Performed by: FAMILY MEDICINE

## 2023-12-04 RX ORDER — ESZOPICLONE 2 MG/1
2 TABLET, FILM COATED ORAL NIGHTLY PRN
Qty: 30 TABLET | Refills: 5 | Status: SHIPPED | OUTPATIENT
Start: 2023-12-04

## 2023-12-04 RX ORDER — LORAZEPAM 0.5 MG/1
0.5 TABLET ORAL DAILY PRN
Qty: 10 TABLET | Refills: 0 | Status: SHIPPED | OUTPATIENT
Start: 2023-12-04

## 2023-12-04 NOTE — PROGRESS NOTES
Assessment:       1. Other hyperlipidemia    2. Other insomnia    3. Anxiety    4. Sore throat        Plan:            Other hyperlipidemia:  Uncontrolled    Other insomnia:  Stable  -     eszopiclone (LUNESTA) 2 MG Tab; Take 1 tablet (2 mg total) by mouth nightly as needed (insomnia).  Dispense: 30 tablet; Refill: 5    Anxiety:  Stable  -     LORazepam (ATIVAN) 0.5 MG tablet; Take 1 tablet (0.5 mg total) by mouth daily as needed for Anxiety.  Dispense: 10 tablet; Refill: 0    Sore throat:  New problem workup needed  -     POCT Rapid Strep A; Future; Expected date: 12/04/2023       [place] prescription monitoring program was checked and okay, Lunesta was prescribed for the patient, lorazepam was prescribed for the patient to take as needed for anxiety when flights.  Strep was negative.  Healthy habits, avoid any ultra processed foods, eat more vegetables, more salads, drink more water, increase physical activity as tolerated.  The patient's BMI has been recorded in the chart. The patient has been provided educational materials regarding the benefits of attaining and maintaining a normal weight. We will continue to address and follow this issue during follow up visits.   Patient agreed with assessment and plan. Patient verbalized understanding.     Subjective:       Patient ID: Daron Lea is a 38 y.o. male.    Chief Complaint: Follow-up (6  month follow up )    HPI    The patient is coming here today for a follow-up visit and medication refill.  The patient has insomnia and currently taking Lunesta, the patient will need refills, denies any side effects of the medication.  He also takes lorazepam adjust as needed when he flights and he will need refills today.  The patient stated also that this morning start to have symptoms also throat and congestion and denies any fever cough, chest congestion wheezing.  He has hyperlipidemia, currently not taking medications for this problem, his triglycerides were  elevated.    Past medical history, past social history was reviewed and discussed with the patient.    Review of Systems   Constitutional:  Negative for activity change, appetite change and chills.   HENT:  Positive for congestion and sore throat. Negative for ear discharge.    Eyes:  Negative for discharge and itching.   Respiratory:  Negative for choking and chest tightness.    Cardiovascular:  Negative for chest pain and palpitations.   Gastrointestinal:  Negative for abdominal distention and abdominal pain.   Endocrine: Negative for cold intolerance and heat intolerance.   Genitourinary:  Negative for dysuria and flank pain.   Musculoskeletal:  Negative for arthralgias and back pain.   Skin:  Negative for pallor and rash.   Allergic/Immunologic: Negative for environmental allergies and food allergies.   Neurological:  Negative for dizziness, facial asymmetry and headaches.   Hematological:  Negative for adenopathy. Does not bruise/bleed easily.   Psychiatric/Behavioral:  Positive for sleep disturbance. Negative for agitation, confusion and decreased concentration. The patient is nervous/anxious.        Objective:      Physical Exam  Vitals and nursing note reviewed.   Constitutional:       General: He is not in acute distress.     Appearance: Normal appearance. He is well-developed. He is not diaphoretic.   HENT:      Head: Normocephalic and atraumatic.      Right Ear: External ear normal.      Left Ear: External ear normal.      Nose: Nose normal.      Mouth/Throat:      Pharynx: Posterior oropharyngeal erythema present.   Eyes:      General: No scleral icterus.        Left eye: No discharge.   Cardiovascular:      Rate and Rhythm: Normal rate and regular rhythm.      Heart sounds: Normal heart sounds.   Pulmonary:      Effort: Pulmonary effort is normal. No respiratory distress.      Breath sounds: Normal breath sounds. No wheezing.   Musculoskeletal:      Cervical back: Normal range of motion and neck supple.    Skin:     General: Skin is warm and dry.      Coloration: Skin is not pale.      Findings: No erythema.   Neurological:      Mental Status: He is alert.      Cranial Nerves: No cranial nerve deficit.      Motor: No abnormal muscle tone.   Psychiatric:         Behavior: Behavior normal.         Thought Content: Thought content normal.         Judgment: Judgment normal.            07-Jan-2019 13:43

## 2023-12-05 RX ORDER — ATORVASTATIN CALCIUM 10 MG/1
10 TABLET, FILM COATED ORAL NIGHTLY
Qty: 90 TABLET | Refills: 3 | Status: SHIPPED | OUTPATIENT
Start: 2023-12-05 | End: 2024-12-04

## 2023-12-05 RX ORDER — ACETAMINOPHEN 160 MG/5ML
200 SUSPENSION, ORAL (FINAL DOSE FORM) ORAL DAILY
Start: 2023-12-05

## 2023-12-06 ENCOUNTER — PATIENT MESSAGE (OUTPATIENT)
Dept: FAMILY MEDICINE | Facility: CLINIC | Age: 38
End: 2023-12-06
Payer: COMMERCIAL

## 2023-12-06 LAB
APO A-I SERPL-MCNC: 120 MG/DL
APO B SERPL-MCNC: 171 MG/DL
APOLIPOPROTEIN B/A1 RATIO: 1.4

## 2024-01-09 ENCOUNTER — E-VISIT (OUTPATIENT)
Dept: FAMILY MEDICINE | Facility: CLINIC | Age: 39
End: 2024-01-09
Payer: COMMERCIAL

## 2024-01-09 DIAGNOSIS — R51.9 SINUS HEADACHE: ICD-10-CM

## 2024-01-09 DIAGNOSIS — J01.00 SUBACUTE MAXILLARY SINUSITIS: ICD-10-CM

## 2024-01-09 DIAGNOSIS — R05.9 COUGH, UNSPECIFIED TYPE: Primary | ICD-10-CM

## 2024-01-09 PROCEDURE — 99422 OL DIG E/M SVC 11-20 MIN: CPT | Mod: ,,, | Performed by: STUDENT IN AN ORGANIZED HEALTH CARE EDUCATION/TRAINING PROGRAM

## 2024-01-09 RX ORDER — PROMETHAZINE HYDROCHLORIDE AND DEXTROMETHORPHAN HYDROBROMIDE 6.25; 15 MG/5ML; MG/5ML
5 SYRUP ORAL EVERY 6 HOURS PRN
Qty: 118 ML | Refills: 1 | Status: SHIPPED | OUTPATIENT
Start: 2024-01-09 | End: 2024-01-19

## 2024-01-09 RX ORDER — NAPROXEN 500 MG/1
500 TABLET ORAL 2 TIMES DAILY PRN
Qty: 14 TABLET | Refills: 0 | Status: SHIPPED | OUTPATIENT
Start: 2024-01-09 | End: 2024-01-16

## 2024-01-09 RX ORDER — OXYMETAZOLINE HCL 0.05 %
2 SPRAY, NON-AEROSOL (ML) NASAL 2 TIMES DAILY
Qty: 6 ML | Refills: 0 | Status: SHIPPED | OUTPATIENT
Start: 2024-01-09 | End: 2024-01-12

## 2024-01-09 RX ORDER — DOXYCYCLINE HYCLATE 100 MG
100 TABLET ORAL 2 TIMES DAILY
Qty: 14 TABLET | Refills: 0 | Status: SHIPPED | OUTPATIENT
Start: 2024-01-09 | End: 2024-01-16

## 2024-01-09 NOTE — PROGRESS NOTES
Patient ID: Daron Lea is a 38 y.o. male.    Chief Complaint: URI (Entered automatically based on patient selection in Patient Portal.)          274}  The patient initiated a request through [employer] on 1/9/2024 for evaluation and management with a chief complaint of URI (Entered automatically based on patient selection in Patient Portal.)     I evaluated the questionnaire submission on 01/09/2024 .    [facility] Peq Evisit Upper Respitatory/Cough Questionnaire    1/9/2024  9:25 AM CST - Filed by Patient   Do you agree to participate in an E-Visit? Yes   If you have any of the following symptoms, please present to your local ER or call 911:  I acknowledge   What is the main issue that you would like for your doctor to address today? Cough, runny nose, achey   Are you able to take your vital signs? Yes   Systolic Blood Pressure:    Diastolic Blood Pressure:    Weight: 205   Height: 71   Pulse:    Temperature: 98.5   Respiration rate:    Pulse Oxygen:    What symptoms do you currently have?  Cough;  Headache;  Nasal Congestion;  Runny nose   Describe your cough: Productive (containing mucus)   Describe the mucus: Green;  Thick   Have you ever smoked? I have never smoked   Have you had a fever? No   When did your symptoms first appear? 1/6/2024   In the last two weeks, have you been in close contact with someone who has COVID-19 or the Flu? No   In the last two weeks, have you worked or volunteered in a healthcare facility or as a ? Healthcare facilities include a hospital, medical or dental clinic, long-term care facility, or nursing home Yes   Do you live in a long-term care facility, nursing home, group home, or homeless shelter? No   List what you have done or taken to help your symptoms. Tamiflu, musinex, tylenol   How severe are your symptoms? Moderate   Have your symptoms improved since they first appeared? No change   Have you taken an at home Covid test? Yes   What were the results? Negative   Have  you taken a Flu test? Yes   What were the results? Negative   Have you been fully vaccinated for COVID? (2 Pfizer, 2 Moderna or 1 Amrit & Amrit vaccine injections) Yes   Have you received a booster? Yes   Have you recieved a Flu shot? Yes   When did you recieve your Flu shot? 10/1/2024   Do you have transportation to get tested for COVID if it is indicated and ordered for you at an [facility] location? Yes   Provide any information you feel is important to your history not asked above    Please attach any relevant images or files           Active Problem List with Overview Notes    Diagnosis Date Noted    Hypertriglyceridemia 10/24/2019    Claustrophobia 10/24/2019      Recent Labs Obtained:  Lab Results   Component Value Date    WBC 6.64 2023    HGB 15.3 2023    HCT 45.1 2023    MCV 89 2023     2023     2023    K 4.5 2023    GLU 91 2023    CREATININE 1.1 2023    EGFRNORACEVR >60.0 2023    HGBA1C 5.2 2023      Review of patient's allergies indicates:  No Known Allergies    Encounter Diagnoses   Name Primary?    Cough, unspecified type Yes    Subacute maxillary sinusitis     Sinus headache         No orders of the defined types were placed in this encounter.     Medications Ordered This Encounter   Medications    doxycycline (VIBRA-TABS) 100 MG tablet     Sig: Take 1 tablet (100 mg total) by mouth 2 (two) times daily. for 7 days     Dispense:  14 tablet     Refill:  0    naproxen (NAPROSYN) 500 MG tablet     Sig: Take 1 tablet (500 mg total) by mouth 2 (two) times daily as needed (pain).     Dispense:  14 tablet     Refill:  0    oxymetazoline (AFRIN, OXYMETAZOLINE,) 0.05 % nasal spray     Si sprays by Nasal route 2 (two) times daily. Do not take over 3 days due to rebound congestion for 3 days     Dispense:  6 mL     Refill:  0    promethazine-dextromethorphan (PROMETHAZINE-DM) 6.25-15 mg/5 mL Syrp     Sig: Take 5 mLs by mouth every  6 (six) hours as needed (cough).     Dispense:  118 mL     Refill:  1        E-Visit Time Tracking:    Day 1 Time (in minutes): 11     Total Time (in minutes): 11    This is the extent of this pleasant patient's concerns at this present time. He did not feel chest pain upon exertion. No unilateral leg swelling, calf tenderness, or calf pain.    274}

## 2024-01-13 RX ORDER — BUSPIRONE HYDROCHLORIDE 5 MG/1
5 TABLET ORAL 2 TIMES DAILY
Qty: 60 TABLET | Refills: 4 | Status: SHIPPED | OUTPATIENT
Start: 2024-01-13

## 2024-01-13 NOTE — TELEPHONE ENCOUNTER
Care Due:                  Date            Visit Type   Department     Provider  --------------------------------------------------------------------------------                                EP -                              [facility] FAMILY  Last Visit: 12-      CARE ([facility])   RHONA SLADE                               -                              [facility]  Next Visit: 06-      CARE ([facility])   MEDICINE       SALVADOR SLADE                                                            Last  Test          Frequency    Reason                     Performed    Due Date  --------------------------------------------------------------------------------    CBC.........  12 months..  naproxen.................  04- 04-    [facility] Embedded Care Due Messages. Reference number: [id number].   1/13/2024 1:17:40 PM CST

## 2024-02-01 RX ORDER — MONTELUKAST SODIUM 10 MG/1
10 TABLET ORAL
Qty: 90 TABLET | Refills: 0 | Status: SHIPPED | OUTPATIENT
Start: 2024-02-01 | End: 2024-03-29 | Stop reason: SINTOL

## 2024-02-01 NOTE — TELEPHONE ENCOUNTER
No care due was identified.  Health [facility] Embedded Care Due Messages. Reference number: [id number].   2/01/2024 9:58:58 AM CST

## 2024-02-01 NOTE — TELEPHONE ENCOUNTER
Refill Routing Note   Medication(s) are not appropriate for processing by [facility] for the following reason(s):        No active prescription written by provider    [facility] action(s):  Defer               Appointments  past 12m or future 3m with PCP    Date Provider   Last Visit   12/4/2023 Tiana Hernandez MD   Next Visit   6/5/2024 Tiana Hernandez MD   ED visits in past 90 days: 0        Note composed:10:04 AM 02/01/2024

## 2024-02-06 ENCOUNTER — PATIENT MESSAGE (OUTPATIENT)
Dept: INTERNAL MEDICINE | Facility: CLINIC | Age: 39
End: 2024-02-06

## 2024-03-04 DIAGNOSIS — Z00.00 ANNUAL PHYSICAL EXAM: Primary | ICD-10-CM

## 2024-03-18 ENCOUNTER — PATIENT MESSAGE (OUTPATIENT)
Dept: FAMILY MEDICINE | Facility: CLINIC | Age: 39
End: 2024-03-18
Payer: COMMERCIAL

## 2024-03-18 DIAGNOSIS — R05.9 COUGH, UNSPECIFIED TYPE: Primary | ICD-10-CM

## 2024-03-20 ENCOUNTER — E-VISIT (OUTPATIENT)
Dept: FAMILY MEDICINE | Facility: CLINIC | Age: 39
End: 2024-03-20
Payer: COMMERCIAL

## 2024-03-20 DIAGNOSIS — R05.8 OTHER COUGH: Primary | ICD-10-CM

## 2024-03-20 DIAGNOSIS — J02.9 SORE THROAT: ICD-10-CM

## 2024-03-20 PROCEDURE — 99422 OL DIG E/M SVC 11-20 MIN: CPT | Mod: ,,, | Performed by: FAMILY MEDICINE

## 2024-03-21 RX ORDER — FAMOTIDINE 40 MG/1
40 TABLET, FILM COATED ORAL NIGHTLY
Qty: 30 TABLET | Refills: 0 | Status: SHIPPED | OUTPATIENT
Start: 2024-03-21 | End: 2024-06-05 | Stop reason: ALTCHOICE

## 2024-03-21 RX ORDER — PROMETHAZINE HYDROCHLORIDE AND DEXTROMETHORPHAN HYDROBROMIDE 6.25; 15 MG/5ML; MG/5ML
5 SYRUP ORAL EVERY 8 HOURS PRN
Qty: 240 ML | Refills: 0 | Status: SHIPPED | OUTPATIENT
Start: 2024-03-21 | End: 2024-03-28 | Stop reason: ALTCHOICE

## 2024-03-21 NOTE — PROGRESS NOTES
Patient ID: Daron Lea is a 38 y.o. male.    Chief Complaint: No chief complaint on file.    The patient initiated a request through [employer] on 3/20/2024 for evaluation and management with a chief complaint of No chief complaint on file.     I evaluated the questionnaire submission on 3/20/2024.    [facility] Peq [place]    3/20/2024  8:33 PM CDT - Filed by Patient   Do you agree to participate in an E-Visit? Yes   If you have any of the following symptoms, please present to your local ER or call 911:  I acknowledge   Choose the state of your primary residence [place]   What is the main issue that you would like for your doctor to address today? Ongoing cough   Are you able to take your vital signs? No   Please describe your symptoms I cough anytime i eat or drink.   Where is your problem located? Throat   How severe are your symptoms? Severe   Have you had these symptoms before? No   How long have you been having these symptoms? For more than a month   Please list any medications or treatments you have used for your condition and indicate if it was effective or not. Musinex, dayquil/nyquil   What makes this feel better? Nothing   What makes this feel worse? Eating or drinking   Are these symptoms related to a condition that you currently have? No   Please describe any probable cause for these symptoms Na   Provide any information you feel is important to your history not asked above    Please attach any relevant images or files          No diagnosis found.     No orders of the defined types were placed in this encounter.     Medications Ordered This Encounter   Medications    famotidine (PEPCID) 40 MG tablet     Sig: Take 1 tablet (40 mg total) by mouth every evening.     Dispense:  30 tablet     Refill:  0    promethazine-dextromethorphan (PROMETHAZINE-DM) 6.25-15 mg/5 mL Syrp     Sig: Take 5 mLs by mouth every 8 (eight) hours as needed (cough).     Dispense:  240 mL     Refill:  0        No follow-ups on  file.      E-Visit Time Tracking:  15 minutes         Other cough: New problem, next visit workup  -     promethazine-dextromethorphan (PROMETHAZINE-DM) 6.25-15 mg/5 mL Syrp; Take 5 mLs by mouth every 8 (eight) hours as needed (cough).  -     famotidine (PEPCID) 40 MG tablet; Take 1 tablet (40 mg total) by mouth every evening.    Sore throat:  New problem workup needed  -     famotidine (PEPCID) 40 MG tablet; Take 1 tablet (40 mg total) by mouth every evening.        Symptoms could be related with acid reflux, will start promethazine as needed and famotidine at bedtime.  If the symptoms get worse, the patient notify us immediately.

## 2024-03-28 ENCOUNTER — HOSPITAL ENCOUNTER (OUTPATIENT)
Dept: RADIOLOGY | Facility: HOSPITAL | Age: 39
Discharge: HOME | End: 2024-03-28
Attending: FAMILY MEDICINE
Payer: COMMERCIAL

## 2024-03-28 ENCOUNTER — OFFICE VISIT (OUTPATIENT)
Dept: FAMILY MEDICINE | Facility: CLINIC | Age: 39
End: 2024-03-28
Payer: COMMERCIAL

## 2024-03-28 VITALS
WEIGHT: 203.63 LBS | BODY MASS INDEX: 28.51 KG/M2 | HEART RATE: 56 BPM | RESPIRATION RATE: 18 BRPM | SYSTOLIC BLOOD PRESSURE: 120 MMHG | OXYGEN SATURATION: 97 % | DIASTOLIC BLOOD PRESSURE: 80 MMHG | HEIGHT: 71 IN

## 2024-03-28 DIAGNOSIS — J20.8 ACUTE BACTERIAL BRONCHITIS: ICD-10-CM

## 2024-03-28 DIAGNOSIS — F41.9 ANXIETY: ICD-10-CM

## 2024-03-28 DIAGNOSIS — R44.0 AUDITORY HALLUCINATIONS: ICD-10-CM

## 2024-03-28 DIAGNOSIS — J40 BRONCHITIS: ICD-10-CM

## 2024-03-28 DIAGNOSIS — J02.9 SORE THROAT: Primary | ICD-10-CM

## 2024-03-28 DIAGNOSIS — B96.89 ACUTE BACTERIAL BRONCHITIS: ICD-10-CM

## 2024-03-28 PROCEDURE — 3079F DIAST BP 80-89 MM HG: CPT | Mod: CPTII,S$GLB,, | Performed by: FAMILY MEDICINE

## 2024-03-28 PROCEDURE — 99214 OFFICE O/P EST MOD 30 MIN: CPT | Mod: S$GLB,,, | Performed by: FAMILY MEDICINE

## 2024-03-28 PROCEDURE — 99999 PR PBB SHADOW E&M-EST. PATIENT-LVL IV: CPT | Mod: PBBFAC,,, | Performed by: FAMILY MEDICINE

## 2024-03-28 PROCEDURE — 1159F MED LIST DOCD IN RCRD: CPT | Mod: CPTII,S$GLB,, | Performed by: FAMILY MEDICINE

## 2024-03-28 PROCEDURE — 3074F SYST BP LT 130 MM HG: CPT | Mod: CPTII,S$GLB,, | Performed by: FAMILY MEDICINE

## 2024-03-28 PROCEDURE — 3008F BODY MASS INDEX DOCD: CPT | Mod: CPTII,S$GLB,, | Performed by: FAMILY MEDICINE

## 2024-03-28 PROCEDURE — 71046 X-RAY EXAM CHEST 2 VIEWS: CPT | Mod: TC,FY,PO

## 2024-03-28 PROCEDURE — 71046 X-RAY EXAM CHEST 2 VIEWS: CPT | Mod: 26,,, | Performed by: RADIOLOGY

## 2024-03-28 RX ORDER — AZITHROMYCIN 250 MG/1
TABLET, FILM COATED ORAL
Qty: 6 TABLET | Refills: 0 | Status: SHIPPED | OUTPATIENT
Start: 2024-03-28 | End: 2024-04-02

## 2024-03-28 RX ORDER — METHYLPREDNISOLONE 4 MG/1
TABLET ORAL
Qty: 1 EACH | Refills: 0 | Status: SHIPPED | OUTPATIENT
Start: 2024-03-28 | End: 2024-04-18

## 2024-03-28 RX ORDER — ALBUTEROL SULFATE 90 UG/1
2 AEROSOL, METERED RESPIRATORY (INHALATION) EVERY 6 HOURS PRN
Qty: 18 G | Refills: 0 | Status: SHIPPED | OUTPATIENT
Start: 2024-03-28 | End: 2024-06-05

## 2024-03-28 NOTE — PROGRESS NOTES
Assessment:       1. Sore throat    2. Acute bacterial bronchitis    3. Auditory hallucinations        Assessment & Plan  1. Acute bacterial bronchitis: Worsening.  Medrol Dosepak and Z-Toni were prescribed for the patient. I will order a chest x-ray. If his cough is still worse or stable for the next 4 weeks, then we need to do a pulmonary test to check his lung capacity and send him to the pulmonologist.    2. Anxiety.  Stable.    3. Auditory hallucinations:  New problem, workup needed  Recommend to discontinue the montelukast that can cause problems with hallucinations.    Follow-up  The patient will follow up in 06/2024.         The patient's BMI has been recorded in the chart. The patient has been provided educational materials regarding the benefits of attaining and maintaining a normal weight. We will continue to address and follow this issue during follow up visits.   Patient agreed with assessment and plan. Patient verbalized understanding.     Subjective:       Patient ID: Daron Lea is a 38 y.o. male.    Chief Complaint: Sore Throat (For a week ) and Cough (Since mardi gras)    HPI  History of Present Illness  The patient presents for evaluation of multiple medical concerns.    He went to [place] when he got COVID-19. He came home on Tuesday and has been coughing nonstop since then. His throat has been bothering him for the last 7 days, which he thinks is from the cough. He denies any fever. He feels great and has been working out. Anytime he eats or drinks, he coughs. 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 He tried taking the cough syrup before dinner. He feels like his glands are swollen. He denies any runny nose. He did feel short of breath once. He takes probiotics.    The Lunesta helps him go to sleep fine, but he can not get songs out of his head. He wakes up singing the song that was on the TV from last night and all through the day. He  wonders if he should see a psychiatrist. He does not have anxiety unless something triggers it. If he puts his headphones on and connects it to the TV, it distracts him.    Supplemental Information  He has lost 10 pounds. He had 3 knee surgeries and already has arthritis on his knee. He was running a lot and was down to 17 pounds, but the swelling would not go down in his knee.       Past medical history, past social history was reviewed and discussed with the patient.    Review of Systems   HENT:  Positive for congestion and sore throat.    Respiratory:  Positive for cough. Negative for shortness of breath.    Cardiovascular:  Negative for chest pain and leg swelling.   Musculoskeletal:  Positive for arthralgias. Negative for back pain.       Objective:      Physical Exam  Vitals and nursing note reviewed.   Constitutional:       General: He is not in acute distress.     Appearance: Normal appearance. He is well-developed. He is not diaphoretic.   HENT:      Head: Normocephalic and atraumatic.      Right Ear: External ear normal.      Left Ear: External ear normal.      Nose: Nose normal.      Mouth/Throat:      Pharynx: Posterior oropharyngeal erythema present.   Eyes:      General: No scleral icterus.        Left eye: No discharge.   Cardiovascular:      Rate and Rhythm: Normal rate and regular rhythm.      Heart sounds: Normal heart sounds.   Pulmonary:      Effort: Pulmonary effort is normal. No respiratory distress.      Breath sounds: Normal breath sounds. Decreased air movement present. No wheezing.   Musculoskeletal:         General: No tenderness.      Cervical back: Normal range of motion and neck supple.   Skin:     General: Skin is warm and dry.      Coloration: Skin is not pale.   Neurological:      Mental Status: He is alert.      Cranial Nerves: No cranial nerve deficit.      Motor: No abnormal muscle tone.   Psychiatric:         Behavior: Behavior normal.         Thought Content: Thought content  normal.         Judgment: Judgment normal.         Physical Exam       Results       This note was generated with the assistance of ambient listening technology. Verbal consent was obtained by the patient and accompanying visitor(s) for the recording of patient appointment to facilitate this note. I attest to having reviewed and edited the generated note for accuracy, though some syntax or spelling errors may persist. Please contact the author of this note for any clarification.

## 2024-03-29 ENCOUNTER — PATIENT MESSAGE (OUTPATIENT)
Dept: FAMILY MEDICINE | Facility: CLINIC | Age: 39
End: 2024-03-29
Payer: COMMERCIAL

## 2024-06-04 ENCOUNTER — CLINICAL SUPPORT (OUTPATIENT)
Dept: INTERNAL MEDICINE | Facility: CLINIC | Age: 39
End: 2024-06-04
Attending: FAMILY MEDICINE
Payer: COMMERCIAL

## 2024-06-04 ENCOUNTER — CLINICAL SUPPORT (OUTPATIENT)
Dept: INTERNAL MEDICINE | Facility: CLINIC | Age: 39
End: 2024-06-04
Payer: COMMERCIAL

## 2024-06-04 ENCOUNTER — HOSPITAL ENCOUNTER (OUTPATIENT)
Dept: CARDIOLOGY | Facility: HOSPITAL | Age: 39
Discharge: HOME | End: 2024-06-04
Attending: FAMILY MEDICINE
Payer: COMMERCIAL

## 2024-06-04 DIAGNOSIS — Z00.00 ANNUAL PHYSICAL EXAM: ICD-10-CM

## 2024-06-04 DIAGNOSIS — Z00.00 ANNUAL PHYSICAL EXAM: Primary | ICD-10-CM

## 2024-06-04 LAB
ALBUMIN SERPL BCP-MCNC: 4.1 G/DL (ref 3.5–5.2)
ALP SERPL-CCNC: 78 U/L (ref 55–135)
ALT SERPL W/O P-5'-P-CCNC: 35 U/L (ref 10–44)
ANION GAP SERPL CALC-SCNC: 11 MMOL/L (ref 8–16)
AST SERPL-CCNC: 17 U/L (ref 10–40)
BILIRUB SERPL-MCNC: 0.6 MG/DL (ref 0.1–1)
BUN SERPL-MCNC: 18 MG/DL (ref 6–20)
CALCIUM SERPL-MCNC: 9.4 MG/DL (ref 8.7–10.5)
CHLORIDE SERPL-SCNC: 109 MMOL/L (ref 95–110)
CHOLEST SERPL-MCNC: 167 MG/DL (ref 120–199)
CHOLEST/HDLC SERPL: 4.9 {RATIO} (ref 2–5)
CO2 SERPL-SCNC: 21 MMOL/L (ref 23–29)
CREAT SERPL-MCNC: 1 MG/DL (ref 0.5–1.4)
ERYTHROCYTE [DISTWIDTH] IN BLOOD BY AUTOMATED COUNT: 12.5 % (ref 11.5–14.5)
EST. GFR  (NO RACE VARIABLE): >60 ML/MIN/1.73 M^2
ESTIMATED AVG GLUCOSE: 108 MG/DL (ref 68–131)
GLUCOSE SERPL-MCNC: 108 MG/DL (ref 70–110)
HBA1C MFR BLD: 5.4 % (ref 4–5.6)
HCT VFR BLD AUTO: 44.4 % (ref 40–54)
HDLC SERPL-MCNC: 34 MG/DL (ref 40–75)
HDLC SERPL: 20.4 % (ref 20–50)
HGB BLD-MCNC: 15.7 G/DL (ref 14–18)
LDLC SERPL CALC-MCNC: 93.6 MG/DL (ref 63–159)
MCH RBC QN AUTO: 30.7 PG (ref 27–31)
MCHC RBC AUTO-ENTMCNC: 35.4 G/DL (ref 32–36)
MCV RBC AUTO: 87 FL (ref 82–98)
NONHDLC SERPL-MCNC: 133 MG/DL
OHS QRS DURATION: 116 MS
OHS QTC CALCULATION: 413 MS
PLATELET # BLD AUTO: 268 K/UL (ref 150–450)
PMV BLD AUTO: 9.4 FL (ref 9.2–12.9)
POTASSIUM SERPL-SCNC: 4 MMOL/L (ref 3.5–5.1)
PROT SERPL-MCNC: 7.2 G/DL (ref 6–8.4)
RBC # BLD AUTO: 5.12 M/UL (ref 4.6–6.2)
SODIUM SERPL-SCNC: 141 MMOL/L (ref 136–145)
TRIGL SERPL-MCNC: 197 MG/DL (ref 30–150)
WBC # BLD AUTO: 8.42 K/UL (ref 3.9–12.7)

## 2024-06-04 PROCEDURE — 99199 UNLISTED SPECIAL SVC PX/RPRT: CPT | Mod: S$GLB,,, | Performed by: INTERNAL MEDICINE

## 2024-06-04 PROCEDURE — 93005 ELECTROCARDIOGRAM TRACING: CPT | Mod: PO

## 2024-06-04 PROCEDURE — 85027 COMPLETE CBC AUTOMATED: CPT | Mod: PO | Performed by: FAMILY MEDICINE

## 2024-06-04 PROCEDURE — 83036 HEMOGLOBIN GLYCOSYLATED A1C: CPT | Performed by: FAMILY MEDICINE

## 2024-06-04 PROCEDURE — 93010 ELECTROCARDIOGRAM REPORT: CPT | Mod: ,,, | Performed by: INTERNAL MEDICINE

## 2024-06-04 PROCEDURE — 80061 LIPID PANEL: CPT | Performed by: FAMILY MEDICINE

## 2024-06-04 PROCEDURE — 80053 COMPREHEN METABOLIC PANEL: CPT | Mod: PO | Performed by: FAMILY MEDICINE

## 2024-06-05 ENCOUNTER — OFFICE VISIT (OUTPATIENT)
Dept: FAMILY MEDICINE | Facility: CLINIC | Age: 39
End: 2024-06-05
Payer: COMMERCIAL

## 2024-06-05 VITALS
OXYGEN SATURATION: 98 % | HEART RATE: 77 BPM | WEIGHT: 205.13 LBS | TEMPERATURE: 98 F | DIASTOLIC BLOOD PRESSURE: 70 MMHG | SYSTOLIC BLOOD PRESSURE: 120 MMHG | BODY MASS INDEX: 28.72 KG/M2 | HEIGHT: 71 IN | RESPIRATION RATE: 18 BRPM

## 2024-06-05 DIAGNOSIS — E78.6 HDL DEFICIENCY: ICD-10-CM

## 2024-06-05 DIAGNOSIS — Z00.01 ANNUAL VISIT FOR GENERAL ADULT MEDICAL EXAMINATION WITH ABNORMAL FINDINGS: Primary | ICD-10-CM

## 2024-06-05 DIAGNOSIS — E78.49 OTHER HYPERLIPIDEMIA: ICD-10-CM

## 2024-06-05 DIAGNOSIS — R73.09 ABNORMAL GLUCOSE: ICD-10-CM

## 2024-06-05 PROCEDURE — 3008F BODY MASS INDEX DOCD: CPT | Mod: CPTII,S$GLB,, | Performed by: FAMILY MEDICINE

## 2024-06-05 PROCEDURE — 99395 PREV VISIT EST AGE 18-39: CPT | Mod: S$GLB,,, | Performed by: FAMILY MEDICINE

## 2024-06-05 PROCEDURE — 3074F SYST BP LT 130 MM HG: CPT | Mod: CPTII,S$GLB,, | Performed by: FAMILY MEDICINE

## 2024-06-05 PROCEDURE — 3044F HG A1C LEVEL LT 7.0%: CPT | Mod: CPTII,S$GLB,, | Performed by: FAMILY MEDICINE

## 2024-06-05 PROCEDURE — 1159F MED LIST DOCD IN RCRD: CPT | Mod: CPTII,S$GLB,, | Performed by: FAMILY MEDICINE

## 2024-06-05 PROCEDURE — 99999 PR PBB SHADOW E&M-EST. PATIENT-LVL IV: CPT | Mod: PBBFAC,,, | Performed by: FAMILY MEDICINE

## 2024-06-05 PROCEDURE — 3078F DIAST BP <80 MM HG: CPT | Mod: CPTII,S$GLB,, | Performed by: FAMILY MEDICINE

## 2024-06-05 NOTE — PROGRESS NOTES
June 5, 2024                                                                                                                                                                                                                                                                                      Daron Lea  [address] [address]                                                                                                                                                                                                                                                                                                RE: Daron Lea                                                        Clinic #:[id number]                                                                                                                                   Dear  Daron Lea,                                                                                                                                           Thank you for allowing me to serve you and perform your Executive Health exam on June 5, 2024.   This letter will serve a brief summary of the history, physical findings, and laboratory/studies performed and recommendations at that time.                                                                                         REASON FOR VISIT: Executive Health Preventive Physical Examination    Past Medical History:   Diagnosis Date    Allergic rhinitis     History of ear infections     Hyperlipidemia        Past Surgical History:   Procedure Laterality Date    ANTERIOR CRUCIATE LIGAMENT REPAIR Left     SHOULDER ARTHROSCOPY W/ LABRAL REPAIR Right     VASECTOMY         Family History   Problem Relation Name Age of Onset    Breast cancer Mother      Colon cancer Neg Hx         Social History     Socioeconomic History    Marital status:    Tobacco Use    Smoking status: Never    Smokeless tobacco: Never    Substance and Sexual Activity    Alcohol use: Yes     Comment: Occasional    Drug use: Never    Sexual activity: Yes     Partners: Female     Comment:      Social Determinants of Health     Financial Resource Strain: Low Risk  (6/5/2024)    Overall Financial Resource Strain (CARDIA)     Difficulty of Paying Living Expenses: Not very hard   Food Insecurity: No Food Insecurity (6/5/2024)    Hunger Vital Sign     Worried About Running Out of Food in the Last Year: Never true     Ran Out of Food in the Last Year: Never true   Transportation Needs: Unmet Transportation Needs (3/28/2024)    PRAPARE - Transportation     Lack of Transportation (Medical): Yes     Lack of Transportation (Non-Medical): No   Physical Activity: Insufficiently Active (6/5/2024)    Exercise Vital Sign     Days of Exercise per Week: 3 days     Minutes of Exercise per Session: 10 min   Stress: Stress Concern Present (6/5/2024)    [employer] [place] [employer] - Occupational Stress Questionnaire     Feeling of Stress : Rather much   Housing Stability: Unknown (3/28/2024)    Housing Stability Vital Sign     Unable to Pay for Housing in the Last Year: No     Unstable Housing in the Last Year: No       Allergies: Patient has no known allergies.    Current Outpatient Medications   Medication Sig Dispense Refill    atorvastatin (LIPITOR) 10 MG tablet Take 1 tablet (10 mg total) by mouth every evening. 90 tablet 3    busPIRone (BUSPAR) 5 MG Tab TAKE ONE TABLET BY MOUTH TWICE DAILY 60 tablet 4    coenzyme Q10 200 mg capsule Take 200 mg by mouth once daily.      eszopiclone (LUNESTA) 2 MG Tab Take 1 tablet (2 mg total) by mouth nightly as needed (insomnia). 30 tablet 5    loratadine (CLARITIN) 10 mg tablet Take 10 mg by mouth once daily.      LORazepam (ATIVAN) 0.5 MG tablet Take 1 tablet (0.5 mg total) by mouth daily as needed for Anxiety. 10 tablet 0     No current facility-administered medications for this visit.   The patient is  "coming here today for [facility] health annual checkup.  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  He also takes BuSpar for anxiety.  His blood work results showed presence of increase triglycerides, decreased HDL levels and increased glucose levels.  The patient stated that he was exercising eating healthy and had surgeon on the left knee and stop doing his regular routine.  He denies any symptoms of chest pain shortness for breath at this office visit.    REVIEW OF SYSTEMS:  No recent changes in weight, or complaints of fatigue. No recent changes in vision, or hearing. Denies frequent headaches.No recent changes in voice. No new or changing skin lesions. Denies abnormal bruising or bleeding.  Denies chest pain or sensation of skipped beats. No new onset of shortness of breath, or dyspnea on exertion. Denies abdominal discomfort, constipation, diarrhea,or blood in stool. Denies difficulty with urination.   No recent joint swelling or muscle discomfort. Denies pain or weakness in extremeties. No recent loss of balance. Denies problems with sleep or depression.        Remainder of the review of systems without pertinent positves at this time.                                                                              PHYSICAL EXAM:   VITAL SIGNS: /70   Pulse 77   Temp 98 °F (36.7 °C) (Oral)   Resp 18   Ht 5' 11" (1.803 m)   Wt 93.1 kg (205 lb 2.2 oz)   SpO2 98%   BMI 28.61 kg/m²   GENERAL APPEARANCE:  Well nourished and normally developed,  pleasant 38 y.o. male, in good spirits.  SKIN: Without rashes or overt lesions.  HEENT: Head normacephalic. There was no scleral icterus. Mucous membranes were moist. Dentition. Neck is supple, no thyromegally, or carotid bruits.  LUNGS: Clear to auscultation bilaterally. Normal respiratory effort.  HEART: Exam reveals regular rate and " rhythm. First and second heart sounds normal. No murmurs, rubs or gallops.   ABDOMEN: Soft, non-tender, non-distended. Exam reveals normal bowl sounds, no masses, no organomegaly and no aortic enlargement.    EXTREMITIES:  Non edematous. No joint stiffness or tenderness. Full range of motion and strength, upper and lower bilaterally.    LAB DATA/STUDIES REVIEWED:  LABS:  Triglycerides elevated, glucose levels elevated, HDL levels low.  CHEST XRAY:  Not applicable  EKG:  Normal sinus rhythm, normal EKG    ASSESSMENT/RECOMMENDATIONS :    At this time,  you appear to be in good medical condition.    Continue to work on regular exercise, maintenance of a healthy weight, balanced diet rich in fruits/vegetables and lean protein, and avoidance of unhealthy habits like smoking and excessive alcohol intake.  I look forward to seeing you again next year.    Please contact me should you have any questions or concerns regarding physical findings, or my recommendations.       Sincerely yours,        Tiana Hernandez M.D.  Department of Family Practice  [facility]

## 2024-06-07 RX ORDER — BUSPIRONE HYDROCHLORIDE 5 MG/1
5 TABLET ORAL 2 TIMES DAILY
Qty: 180 TABLET | Refills: 3 | Status: SHIPPED | OUTPATIENT
Start: 2024-06-07 | End: 2025-06-07

## 2024-06-07 NOTE — TELEPHONE ENCOUNTER
No care due was identified.  Health [facility] Embedded Care Due Messages. Reference number: [id number].   6/07/2024 10:54:50 AM CDT

## 2024-07-08 DIAGNOSIS — G47.09 OTHER INSOMNIA: ICD-10-CM

## 2024-07-08 NOTE — TELEPHONE ENCOUNTER
No care due was identified.  Health [facility] Embedded Care Due Messages. Reference number: [id number].   7/08/2024 9:43:26 AM CDT

## 2024-07-09 RX ORDER — ESZOPICLONE 2 MG/1
2 TABLET, FILM COATED ORAL NIGHTLY PRN
Qty: 30 TABLET | Refills: 2 | Status: SHIPPED | OUTPATIENT
Start: 2024-07-09

## 2024-07-15 DIAGNOSIS — F41.9 ANXIETY: ICD-10-CM

## 2024-07-15 RX ORDER — LORAZEPAM 0.5 MG/1
0.5 TABLET ORAL DAILY PRN
Qty: 10 TABLET | Refills: 0 | Status: SHIPPED | OUTPATIENT
Start: 2024-07-15

## 2024-07-15 NOTE — TELEPHONE ENCOUNTER
No care due was identified.  Health [facility] Embedded Care Due Messages. Reference number: [id number].   7/15/2024 9:23:14 AM CDT

## 2024-08-09 DIAGNOSIS — F41.9 ANXIETY: ICD-10-CM

## 2024-08-09 RX ORDER — LORAZEPAM 0.5 MG/1
TABLET ORAL
Qty: 10 TABLET | Refills: 0 | Status: SHIPPED | OUTPATIENT
Start: 2024-08-09

## 2024-09-18 ENCOUNTER — PATIENT MESSAGE (OUTPATIENT)
Dept: FAMILY MEDICINE | Facility: CLINIC | Age: 39
End: 2024-09-18
Payer: COMMERCIAL

## 2024-09-19 ENCOUNTER — PATIENT MESSAGE (OUTPATIENT)
Dept: FAMILY MEDICINE | Facility: CLINIC | Age: 39
End: 2024-09-19
Payer: COMMERCIAL

## 2024-09-20 ENCOUNTER — PATIENT MESSAGE (OUTPATIENT)
Dept: FAMILY MEDICINE | Facility: CLINIC | Age: 39
End: 2024-09-20
Payer: COMMERCIAL

## 2024-11-15 DIAGNOSIS — E78.2 MIXED HYPERLIPIDEMIA: ICD-10-CM

## 2024-11-15 RX ORDER — ATORVASTATIN CALCIUM 10 MG/1
10 TABLET, FILM COATED ORAL NIGHTLY
Qty: 90 TABLET | Refills: 1 | Status: SHIPPED | OUTPATIENT
Start: 2024-11-15

## 2024-11-15 NOTE — TELEPHONE ENCOUNTER
No care due was identified.  Health [facility] Embedded Care Due Messages. Reference number: [id number].   11/15/2024 10:50:15 AM CST

## 2024-11-15 NOTE — TELEPHONE ENCOUNTER
Refill Decision Note   Daron Lea  is requesting a refill authorization.    Brief Assessment and Rationale for Refill:   Approve       Medication Therapy Plan:         Comments:     Note composed:5:14 PM 11/15/2024

## 2024-11-18 ENCOUNTER — TELEPHONE (OUTPATIENT)
Dept: FAMILY MEDICINE | Facility: CLINIC | Age: 39
End: 2024-11-18
Payer: COMMERCIAL

## 2024-11-18 DIAGNOSIS — F41.9 ANXIETY: ICD-10-CM

## 2024-11-18 DIAGNOSIS — G47.09 OTHER INSOMNIA: ICD-10-CM

## 2024-11-18 RX ORDER — LORAZEPAM 0.5 MG/1
0.5 TABLET ORAL DAILY PRN
Qty: 10 TABLET | Refills: 0 | Status: SHIPPED | OUTPATIENT
Start: 2024-11-18

## 2024-11-18 RX ORDER — ESZOPICLONE 2 MG/1
2 TABLET, FILM COATED ORAL NIGHTLY PRN
Qty: 30 TABLET | Refills: 0 | Status: SHIPPED | OUTPATIENT
Start: 2024-11-18

## 2024-11-18 NOTE — TELEPHONE ENCOUNTER
No care due was identified.  [facility] Embedded Care Due Messages. Reference number: [id number].   11/18/2024 3:10:45 PM CST

## 2024-11-19 ENCOUNTER — PATIENT MESSAGE (OUTPATIENT)
Dept: FAMILY MEDICINE | Facility: CLINIC | Age: 39
End: 2024-11-19
Payer: COMMERCIAL

## 2024-11-20 ENCOUNTER — LAB VISIT (OUTPATIENT)
Dept: LAB | Facility: HOSPITAL | Age: 39
End: 2024-11-20
Attending: FAMILY MEDICINE
Payer: COMMERCIAL

## 2024-11-20 DIAGNOSIS — R73.09 ABNORMAL GLUCOSE: ICD-10-CM

## 2024-11-20 DIAGNOSIS — E78.6 HDL DEFICIENCY: ICD-10-CM

## 2024-11-20 DIAGNOSIS — E78.49 OTHER HYPERLIPIDEMIA: ICD-10-CM

## 2024-11-20 LAB
ALBUMIN SERPL BCP-MCNC: 4.1 G/DL (ref 3.5–5.2)
ALP SERPL-CCNC: 89 U/L (ref 40–150)
ALT SERPL W/O P-5'-P-CCNC: 31 U/L (ref 10–44)
ANION GAP SERPL CALC-SCNC: 7 MMOL/L (ref 8–16)
AST SERPL-CCNC: 17 U/L (ref 10–40)
BILIRUB SERPL-MCNC: 0.6 MG/DL (ref 0.1–1)
BUN SERPL-MCNC: 16 MG/DL (ref 6–20)
CALCIUM SERPL-MCNC: 9.3 MG/DL (ref 8.7–10.5)
CHLORIDE SERPL-SCNC: 106 MMOL/L (ref 95–110)
CHOLEST SERPL-MCNC: 183 MG/DL (ref 120–199)
CHOLEST/HDLC SERPL: 5.2 {RATIO} (ref 2–5)
CO2 SERPL-SCNC: 26 MMOL/L (ref 23–29)
CREAT SERPL-MCNC: 1 MG/DL (ref 0.5–1.4)
EST. GFR  (NO RACE VARIABLE): >60 ML/MIN/1.73 M^2
ESTIMATED AVG GLUCOSE: 105 MG/DL (ref 68–131)
GLUCOSE SERPL-MCNC: 101 MG/DL (ref 70–110)
HBA1C MFR BLD: 5.3 % (ref 4–5.6)
HDLC SERPL-MCNC: 35 MG/DL (ref 40–75)
HDLC SERPL: 19.1 % (ref 20–50)
INSULIN COLLECTION INTERVAL: NORMAL
INSULIN SERPL-ACNC: 9.4 UU/ML
LDLC SERPL CALC-MCNC: 111.4 MG/DL (ref 63–159)
NONHDLC SERPL-MCNC: 148 MG/DL
POTASSIUM SERPL-SCNC: 4 MMOL/L (ref 3.5–5.1)
PROT SERPL-MCNC: 7.2 G/DL (ref 6–8.4)
SODIUM SERPL-SCNC: 139 MMOL/L (ref 136–145)
TRIGL SERPL-MCNC: 183 MG/DL (ref 30–150)

## 2024-11-20 PROCEDURE — 80061 LIPID PANEL: CPT | Performed by: FAMILY MEDICINE

## 2024-11-20 PROCEDURE — 80053 COMPREHEN METABOLIC PANEL: CPT | Performed by: FAMILY MEDICINE

## 2024-11-20 PROCEDURE — 83036 HEMOGLOBIN GLYCOSYLATED A1C: CPT | Performed by: FAMILY MEDICINE

## 2024-11-20 PROCEDURE — 83525 ASSAY OF INSULIN: CPT | Performed by: FAMILY MEDICINE

## 2024-12-09 ENCOUNTER — PATIENT MESSAGE (OUTPATIENT)
Dept: PSYCHIATRY | Facility: CLINIC | Age: 39
End: 2024-12-09
Payer: COMMERCIAL

## 2024-12-09 ENCOUNTER — OFFICE VISIT (OUTPATIENT)
Dept: FAMILY MEDICINE | Facility: CLINIC | Age: 39
End: 2024-12-09
Payer: COMMERCIAL

## 2024-12-09 VITALS
OXYGEN SATURATION: 95 % | WEIGHT: 213.63 LBS | HEART RATE: 75 BPM | SYSTOLIC BLOOD PRESSURE: 118 MMHG | DIASTOLIC BLOOD PRESSURE: 76 MMHG | BODY MASS INDEX: 29.91 KG/M2 | HEIGHT: 71 IN

## 2024-12-09 DIAGNOSIS — F41.9 ANXIETY: ICD-10-CM

## 2024-12-09 DIAGNOSIS — Z23 IMMUNIZATION DUE: ICD-10-CM

## 2024-12-09 DIAGNOSIS — G47.09 OTHER INSOMNIA: ICD-10-CM

## 2024-12-09 DIAGNOSIS — E78.49 OTHER HYPERLIPIDEMIA: ICD-10-CM

## 2024-12-09 DIAGNOSIS — R73.09 ABNORMAL GLUCOSE: ICD-10-CM

## 2024-12-09 DIAGNOSIS — F40.240 CLAUSTROPHOBIA: Primary | ICD-10-CM

## 2024-12-09 PROCEDURE — G0008 ADMIN INFLUENZA VIRUS VAC: HCPCS | Mod: S$GLB,,, | Performed by: FAMILY MEDICINE

## 2024-12-09 PROCEDURE — 3074F SYST BP LT 130 MM HG: CPT | Mod: CPTII,S$GLB,, | Performed by: FAMILY MEDICINE

## 2024-12-09 PROCEDURE — 1159F MED LIST DOCD IN RCRD: CPT | Mod: CPTII,S$GLB,, | Performed by: FAMILY MEDICINE

## 2024-12-09 PROCEDURE — 3078F DIAST BP <80 MM HG: CPT | Mod: CPTII,S$GLB,, | Performed by: FAMILY MEDICINE

## 2024-12-09 PROCEDURE — 90656 IIV3 VACC NO PRSV 0.5 ML IM: CPT | Mod: S$GLB,,, | Performed by: FAMILY MEDICINE

## 2024-12-09 PROCEDURE — 99999 PR PBB SHADOW E&M-EST. PATIENT-LVL IV: CPT | Mod: PBBFAC,,, | Performed by: FAMILY MEDICINE

## 2024-12-09 PROCEDURE — 3044F HG A1C LEVEL LT 7.0%: CPT | Mod: CPTII,S$GLB,, | Performed by: FAMILY MEDICINE

## 2024-12-09 PROCEDURE — 99214 OFFICE O/P EST MOD 30 MIN: CPT | Mod: S$GLB,,, | Performed by: FAMILY MEDICINE

## 2024-12-09 PROCEDURE — 3008F BODY MASS INDEX DOCD: CPT | Mod: CPTII,S$GLB,, | Performed by: FAMILY MEDICINE

## 2024-12-09 NOTE — PROGRESS NOTES
Assessment:       1. Claustrophobia    2. Anxiety    3. Other insomnia    4. Immunization due    5. Abnormal glucose    6. Other hyperlipidemia        Assessment & Plan    Claustrophobia: Worsening  -     Ambulatory referral/consult to Psychology; Future; Expected date: 12/16/2024    Anxiety: Worsening  -     Ambulatory referral/consult to Psychology; Future; Expected date: 12/16/2024    Other insomnia: Improved    Immunization due  -     influenza (Flulaval, Fluzone, Fluarix) 45 mcg/0.5 mL IM vaccine (> or = 6 mo) 0.5 mL    Abnormal glucose: Worsening    Other hyperlipidemia:  Improved       Assessed patient's claustrophobia and anxiety  Reviewed A1C and fasting glucose levels, noting A1C within normal range but fasting glucose occasionally elevated  Evaluated current medication regimen, including Buspar, cholesterol medication, and sleep aids  Considered impact of recent knee surgeries on patient's exercise routine and weight management  Discussed potential link between late-night eating habits and elevated fasting glucose levels    ANXIETY AND CLAUSTROPHOBIA:  - Continued Ativan for use during air travel.  - Referred the patient to psychology for anxiety and claustrophobia management.  - Advised the patient to contact the office if psychology department does not call within 1 week to schedule an appointment.  - Noted worsening claustrophobia, especially during travel and in enclosed spaces.  - Acknowledged the severity of the patient's claustrophobia and suggested psychological intervention.  - Discussed alternative management strategies for anxiety related to claustrophobia, particularly in anticipation of confined spaces.  - Continued Buspar 2 times daily.    PREDIABETES:  - Emphasized the importance of maintaining fasting glucose levels in the 80-90 mg/dL range.  - Discussed the impact of late-night snacking on fasting glucose levels.  - Noted that the patient's A1C was 5.3% two weeks ago.  - Observed that  "fasting glucose levels are sometimes elevated, ranging from 100-105 mg/dL.  - Assessed that late-night eating may be contributing to high fasting glucose levels.  - Recommend dietary changes, particularly avoiding late-night snacking, and encouraged regular exercise.  - Evaluated that late-night eating is contributing to high fasting glucose levels.  - Discussed the impact of late-night eating on fasting glucose levels and overall health.  - Recommend avoiding late-night snacking and encouraged regular exercise for better health outcomes.  - Educated on benefits of regular exercise, including both cardio and weight training, for overall health and glucose management.  - Patient to reduce or eliminate late-night snacking, particularly cereal.  - Patient can consider substituting a small portion of ice cream for late-night snacks if needed.    HYPERLIPIDEMIA:  - Continued cholesterol medication.    INSOMNIA:  - Continued Lunesta as needed (approximately once every 2 weeks).  - Noted improved use of sleep medication.  - Advised the patient to use Lunesta particularly when anticipating anxiety-inducing events.    SINUS ISSUES:  - Continued morning Claritin for sinus problems.    WEIGHT MANAGEMENT:  - Noted patient's late-night snacking habits and recent weight gain.    FLU VACCINATION:  - Assessed the need for flu vaccination.  - Administered flu vaccine in office.    EXERCISE RECOMMENDATIONS:  - Patient to continue gym attendance, aiming for at least 2 times per week.  - Recommend incorporating a mix of cardio (e.g., stationary bike) and weight training exercises.  - Patient to aim for heart rate in the "green zone" (approximately 120-130 bpm) during cardio exercises.    FOLLOW UP:  - Follow up in 6 months.            The patient's BMI has been recorded in the chart. The patient has been provided educational materials regarding the benefits of attaining and maintaining a normal weight. We will continue to address and " follow this issue during follow up visits.   Patient agreed with assessment and plan. Patient verbalized understanding.     Subjective:       Patient ID: Daron Lea is a 39 y.o. male.    Chief Complaint: Follow-up (6m)      History of Present Illness    CHIEF COMPLAINT:  Patient presents for a routine follow-up visit and to discuss management of claustrophobia and anxiety.    HPI:  Patient reports worsening claustrophobia. He had a severe episode during a family vacation in [place] when entering a small cave, requiring immediate exit and leaving his children behind. He also nearly exited a mirror maze due to symptoms. Patient is concerned about symptom management, particularly in confined spaces and while traveling.    Patient's anxiety is exacerbated before flying, specifically during pre-boarding and while getting settled in his seat. Symptoms improve once the plane is in motion. He uses Ativan as needed to manage anxiety during flights.    Lunesta is used for sleep approximately once every 2 weeks, typically when anticipating anxiety-related sleep difficulties due to important events the following morning.    Patient attends the gym about twice weekly on average over the past month, following reduced activity due to 2 knee surgeries since June. He currently uses a stationary bike and does weight training, aiming to increase his heart rate to 115-120 bpm during workouts.    Patient consumes cereal before bed. His fasting glucose levels have been elevated, with recent measurements around 100-101 mg/dL.    MEDICATIONS:  Patient is on Buspirone (Buspar) twice daily for anxiety. He takes Atorvastatin 10 mg daily for cholesterol management. Lunesta is used as needed, approximately once every 2 weeks, for sleep and anxiety. Ativan is taken as needed for flying to manage anxiety and claustrophobia. Patient also takes Claritin daily in the morning for sinus problems.    MEDICAL HISTORY:  Patient has a history of  anxiety and claustrophobia.    SURGICAL HISTORY:  Patient underwent knee surgery in June. After June, he had a second knee surgery.    TEST RESULTS:  Patient's A1C was 5.3 two weeks ago. His fasting glucose has been measured multiple times, ranging from 100-105.    SOCIAL HISTORY:  Patient consumed alcohol during a recent 10-day vacation.      ROS:  ROS as indicated in HPI.          Past medical history, past social history was reviewed and discussed with the patient.        Objective:      Physical Exam      Vitals: Weight: 213 lbs.  General: No acute distress. Well-developed. Well-nourished.  Eyes: EOMI. Sclerae anicteric.  HENT: Normocephalic. Atraumatic. Nares patent. Moist oral mucosa.  Cardiovascular: Regular rate. Regular rhythm.   Respiratory: Normal respiratory effort. Clear to auscultation bilaterally. No rales. No rhonchi. No wheezing.  Musculoskeletal: No  obvious deformity.  Extremities: No lower extremity edema.  Neurological: Alert & oriented x3. No slurred speech. Normal gait.  Psychiatric: Normal mood. Normal affect. Good insight. Good judgment.  Skin: Warm. Dry. No rash.                   This note was generated with the assistance of ambient listening technology. Verbal consent was obtained by the patient and accompanying visitor(s) for the recording of patient appointment to facilitate this note. I attest to having reviewed and edited the generated note for accuracy, though some syntax or spelling errors may persist. Please contact the author of this note for any clarification.

## 2024-12-19 ENCOUNTER — PATIENT MESSAGE (OUTPATIENT)
Dept: FAMILY MEDICINE | Facility: CLINIC | Age: 39
End: 2024-12-19
Payer: COMMERCIAL

## 2025-01-09 ENCOUNTER — E-VISIT (OUTPATIENT)
Dept: FAMILY MEDICINE | Facility: CLINIC | Age: 40
End: 2025-01-09
Payer: COMMERCIAL

## 2025-01-09 ENCOUNTER — PATIENT MESSAGE (OUTPATIENT)
Dept: FAMILY MEDICINE | Facility: CLINIC | Age: 40
End: 2025-01-09
Payer: COMMERCIAL

## 2025-01-09 DIAGNOSIS — F41.9 ANXIETY: ICD-10-CM

## 2025-01-09 RX ORDER — LORAZEPAM 0.5 MG/1
0.5 TABLET ORAL DAILY PRN
Qty: 10 TABLET | Refills: 0 | Status: SHIPPED | OUTPATIENT
Start: 2025-01-09

## 2025-01-10 RX ORDER — BUSPIRONE HYDROCHLORIDE 7.5 MG/1
7.5 TABLET ORAL 2 TIMES DAILY
Qty: 180 TABLET | Refills: 3 | Status: SHIPPED | OUTPATIENT
Start: 2025-01-10 | End: 2026-01-10

## 2025-01-10 NOTE — PROGRESS NOTES
Patient ID: Daron Lea is a 39 y.o. male.    Chief Complaint: Travel Consult (Entered automatically based on patient selection in [employer].)    The patient initiated a request through [employer] on 1/9/2025 for evaluation and management with a chief complaint of Travel Consult (Entered automatically based on patient selection in [employer].)     I evaluated the questionnaire submission on 1/9/2025.    Oh[facility] Peq Evisit Travel    1/9/2025  2:58 PM CST - Filed by Patient   Do you agree to participate in an E-Visit? Yes   If you have any of the following symptoms, please present to your local emergency room or call 911: I acknowledge   Choose the state of your primary residence [place]   What is the main issue you would like addressed today? Travel concerns   I would like to address: Medication for Travel Concerns   What countries and cities are you visiting? [place], [facility]   What is the purpose of your trip? Business   How many days is your trip? 10   Do you plan to do any of these activities during your trip? Take a cruise;  Other   What activities do you plan to do? Flying   Where will you stay during your trip? Cruise ship;  Hotel   Do you need a medication for any of the following? Anxiety;  Motion sickness;  Other   What do you need medication for? Claustrophobia   Have you gotten any vaccines from a pharmacy or outside clinic? No   Would you like vaccine recommendations for your trip? No   Please upload any travel documents, accommodations, or forms for your provider to complete.     Provide any additional information you feel is important.    Please attach any relevant images or files    Are you able to take your vital signs? No   Do you want to address a new or existing medication? I would like to address a medication I currently take   Would you like to change or continue your medication? Change medication   What medication would you like changed?  Buspar ativan   What is your current dose? Not sure    How often do you take your medication? 2 times daily   Why do you need the medication changed? Medication does not work   What effect has your medication had on your problem? Less than expected    What medical condition is the  medication intended to treat? anxiety and claustrophobia   Provide any additional information you feel is important.    Please attach any relevant images or files    Are you able to take your vital signs? No         Encounter Diagnosis   Name Primary?    Anxiety         No orders of the defined types were placed in this encounter.     Medications Ordered This Encounter   Medications    LORazepam (ATIVAN) 0.5 MG tablet     Sig: Take 1 tablet (0.5 mg total) by mouth daily as needed for Anxiety.     Dispense:  10 tablet     Refill:  0        No follow-ups on file.      E-Visit Time Trackin minutes       Anxiety: Stable  -     LORazepam (ATIVAN) 0.5 MG tablet; Take 1 tablet (0.5 mg total) by mouth daily as needed for Anxiety.  Dispense: 10 tablet; Refill: 0        Medication was refill, waiting if the patient wants to increase the dosage for BuSpar to 7.5 mg twice daily instead of 5 mg.  [place] prescription monitoring program was checked and okay.

## 2025-01-27 ENCOUNTER — OFFICE VISIT (OUTPATIENT)
Dept: PSYCHIATRY | Facility: CLINIC | Age: 40
End: 2025-01-27
Payer: COMMERCIAL

## 2025-01-27 DIAGNOSIS — F41.1 GAD (GENERALIZED ANXIETY DISORDER): Primary | ICD-10-CM

## 2025-01-27 DIAGNOSIS — F40.01 AGORAPHOBIA WITH PANIC ATTACKS: ICD-10-CM

## 2025-01-27 PROCEDURE — 90791 PSYCH DIAGNOSTIC EVALUATION: CPT | Mod: 95,,, | Performed by: SOCIAL WORKER

## 2025-01-27 NOTE — PROGRESS NOTES
"The patient location is:  [address] [place] [id number]   The patient location [place] is: [place]   The patient phone number is: [phone]   Visit type: Virtual visit with synchronous audio and video  Each patient to whom he or she provides medical services by telemedicine is:  (1) informed of the relationship between the physician and patient and the respective role of any other health care provider with respect to management of the patient; and (2) notified that he or she may decline to receive medical services by telemedicine and may withdraw from such care at any time.  Crisis Disclaimer: Patient was informed that due to the virtual nature of the visit, that if a crisis develops, protocols will be implemented to ensure patient safety, including but not limited to: 1) Initiating a welfare check with local Law Enforcement, 2) Calling [employer]/National Crisis Hotline, and/or 3) Initiating PEC/CEC procedures.  Outpatient Psychotherapy Initial Visit  01/27/2025     History of Presenting Illness:    Pt is a 39 y.o. male referred by Tiana Hernandez MD for  Anxiety disorders; agoraphobia with panic attacks [F40.01] .Patient was seen, examined and chart was reviewed. Patient reviewed and agreed to informed consent and limits of confidentiality. Patient was seen, examined and chart was reviewed.    Met with patient.   Pt denied having participated in therapy in the past.   Pt stated he has noticed in the past 4 yrs that he has had more anxiety. Specifically, when flying for work. Had an event recently when in a cave and had a panic attack. Another recent panic attack occurred while driving in busy time of day. Indicated fear of "not being able to escape from things."   Pt has been prescribed buspar and ativan for anxiety.     Pt  for 10 yrs  Pt has 9 and 7 yo children.   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   Pt and spouse have done marriage therapy in the past. Wife stays home and they are " "around each other often.     Grief hx reported. Cousin  from being murdered going into senior year and lived with pt and pt family. Friend  by drowning. Spouse's family members have  recently this year. Denied these people being ones he was close to but identified traumatic losses such as murder suicide and an overdose. Spouse experiencing increased grief reactions.     Pt had hx of marriage 15 yrs ago. Denied this being something that he thinks about often or interrupts pt.   Pt was flying often during COVID in . Stated that this is when symptoms of panic and agoraphobia became present as well as sleeping issues.     Pt reported that there are multiple things that are positive in his life. Dicussed fears of what would happen if this went away.     Pt grew up middle child. Family of 5. Parents . Father worked multiple jobs and family spent a great deal of time around sports. Positive environment growing up in school. Positive relationships with siblings.     Coping skills include breath work ( does not feel helpful), sauna, and leaning on support system at times.     Engaged in rapport building, psychoeducation, and goal setting.  Pt goals are to "get over the phobia", decrease anxiety. Pt would benefit from behavior modification, insight oriented, interactive, and supportive therapies.  Pt receptive to psychotherapy. Assisted with scheduling follow ups.    Practiced grounding techniques in session today as well to prepare for a flight this week.     Current symptoms:  Depression: denies.  Anxiety: excessive worrying and panic attacks.   Sleep: non-restful sleep.  Sydni:  denies.  Psychosis: denies .    Risk assessment:    Suicidal Ideation and Risk:   Pt denied current or history of related symptoms: yes    [place]-Suicide Severity Rating Scale  In the last two weeks     1. Wish to be Dead: Have you ever wished you were dead or not alive anymore, or wish to fall asleep and not wake up?: " "No  2. Suicidal Thoughts: Have you had any thoughts of killing yourself?: No  3. Suicidal Thoughts with Method (withoutSpecific Plan or Intent to Act): Have you been thinking about how you might kill yourself? : No  4. Suicidal Intent (without Specific Plan): Have you had these thoughts and had some intention of acting on them?: No  5. Suicide Intent with Specific Plan: Have you started to work out or worked out the details of how to kill yourself? Do you intend to carry out this plan?: No  6. Suicide Behavior Question: Have you ever done anything, started to do anything, or prepare to do anything to end your life?: No  If "Yes" to question 6: How long ago did you do any of these?: Between a week and a year ago? No        Homicidal/Violent Ideation and Risk:   Pt denied current or history of related symptoms: yes  Patient advised to call [phone] or present the the nearest ED if they experience suicidal or homicidal ideation, plan or intent.      Past Medical History:   Diagnosis Date    Allergic rhinitis     History of ear infections     Hyperlipidemia          Current Outpatient Medications:     atorvastatin (LIPITOR) 10 MG tablet, TAKE ONE TABLET BY MOUTH IN THE EVENING, Disp: 90 tablet, Rfl: 1    busPIRone (BUSPAR) 7.5 MG tablet, Take 1 tablet (7.5 mg total) by mouth 2 (two) times a day., Disp: 180 tablet, Rfl: 3    coenzyme Q10 200 mg capsule, Take 200 mg by mouth once daily., Disp: , Rfl:     eszopiclone (LUNESTA) 2 MG Tab, Take 1 tablet (2 mg total) by mouth nightly as needed (insomnia)., Disp: 30 tablet, Rfl: 0    loratadine (CLARITIN) 10 mg tablet, Take 10 mg by mouth once daily., Disp: , Rfl:     LORazepam (ATIVAN) 0.5 MG tablet, Take 1 tablet (0.5 mg total) by mouth daily as needed for Anxiety., Disp: 10 tablet, Rfl: 0    Psychiatric History:  Previous Psychiatric Outpatient Treatment:  No  Previous Psychiatric Hospitalizations:  No  Previous Suicide Attempts:  No  History of Trauma:  Yes  Access to a " Firearm:  No    Substance Use History:  Tobacco/Nicotine:  No   Alcohol: occasional  Illicit Substances: No  Misuse of Prescription Medications:  No  Caffeine: No      Mental Status Exam:  General Appearance:  unremarkable, age appropriate   Speech: normal tone, normal rate, normal pitch, normal volume      Level of Cooperation: cooperative      Thought Processes: normal and logical   Mood: steady      Thought Content: normal, no suicidality, no homicidality, delusions, or paranoia   Affect: congruent and appropriate   Orientation: Oriented x3   Memory: recent >  intact, remote >  intact   Attention Span & Concentration: intact   Fund of General Knowledge: intact and appropriate to age and level of education   Abstract Reasoning: interpretation of similarities was abstract, interpretation of proverbs was abstract   Judgment & Insight: good     Language intact         PSYCHOSOCIAL AND ENVIRONMENTAL STRESSORS:  Work  anxiety    Clinical Assessment:   Identified symptoms to address in tx:   Anxiety   phobia    Ability to adhere to plan:  cooperative    Rationale for employing these interactive techniques: Applicable to diagnosis     Diagnosis(es):   1. SEAN (generalized anxiety disorder)  Ambulatory referral/consult to Psychology      2. Agoraphobia with panic attacks              Plan   Treatment Goals:  Specify outcomes written in observable, behavioral terms:   Anxiety: acquiring relapse prevention skills, reducing negative automatic thoughts, and reducing physical symptoms of anxiety  Panic: acquiring breathing skills and reducing physical symptoms of anxiety/panic    Treatment Plan/Recommendations:   Medication Management: Continue current medications.  The treatment plan and follow up plan were reviewed with the patient.           Pt is to attend supportive psychotherapy sessions.     This author reviewed limits to confidentiality and this author's collaboration with pt's physician. Pt indicated understanding and  denied any questions.    Return to Clinic: as scheduled  Counseling time: 60    -Call to report any worsening of symptoms or problems associated with medication.  - Pt instructed to go to ER if thoughts of harming self or others arise.   -Supportive therapy and psychoeducation provided  -Pt instructed to call clinic, 911 or go to nearest emergency room if sxs worsen or pt is in crisis. The pt expresses understanding.     Each patient to whom he or she provides medical services by telemedicine is:  (1) informed of the relationship between the physician and patient and the respective role of any other health care provider with respect to management of the patient; and (2) notified that he or she may decline to receive medical services by telemedicine and may withdraw from such care at any time.

## 2025-01-30 DIAGNOSIS — F41.9 ANXIETY: ICD-10-CM

## 2025-01-30 RX ORDER — LORAZEPAM 0.5 MG/1
0.5 TABLET ORAL DAILY PRN
Qty: 10 TABLET | Refills: 0 | Status: SHIPPED | OUTPATIENT
Start: 2025-02-09

## 2025-01-30 NOTE — TELEPHONE ENCOUNTER
No care due was identified.  [facility] Embedded Care Due Messages. Reference number: [id number].   1/30/2025 9:53:53 AM CST

## 2025-02-17 ENCOUNTER — OFFICE VISIT (OUTPATIENT)
Dept: PSYCHIATRY | Facility: CLINIC | Age: 40
End: 2025-02-17
Payer: COMMERCIAL

## 2025-02-17 DIAGNOSIS — F40.01 AGORAPHOBIA WITH PANIC ATTACKS: ICD-10-CM

## 2025-02-17 DIAGNOSIS — F41.1 GAD (GENERALIZED ANXIETY DISORDER): Primary | ICD-10-CM

## 2025-02-17 NOTE — PROGRESS NOTES
"The patient location is:  [address] [place] [id number]   The patient location [place] is: [place]  The patient phone number is:   [phone] (Mobile)                 Visit type: Virtual visit with synchronous audio and video  Each patient to whom he or she provides medical services by telemedicine is:  (1) informed of the relationship between the physician and patient and the respective role of any other health care provider with respect to management of the patient; and (2) notified that he or she may decline to receive medical services by telemedicine and may withdraw from such care at any time.  Crisis Disclaimer: Patient was informed that due to the virtual nature of the visit, that if a crisis develops, protocols will be implemented to ensure patient safety, including but not limited to: 1) Initiating a welfare check with local Law Enforcement, 2) Calling 1/National Crisis Hotline, and/or 3) Initiating PEC/CEC procedures.    Individual Psychotherapy (PhD/LCSW)    02/17/2025    Interim Events/Subjective Report/Content of Current Session:  follow-up appointment.    Pt is a 39 y.o. male with past psychiatric hx of SEAN, Agoraphobia who presents for follow-up treatment.    Pt reported that he recently went on a plane and took medication on his way there but does not believe he took meds on the way home. Reported he has started speaking with a "sleep ." Sleep  appears to have assisted pt with normalizing his sleep patterns.     Pt identified that he used grounding techniques during his flight. Stated these were moments of progress.   Reviewed utilizing journaling, speaking to , grounding techniques.  Pt and sw normalized emotions reported as well to address resistance.     Pt identified having thoughts of anxiety leading up situations and not taking medication prior to these situations and instead of after. Pt also explained how journaling continues to be effective. " Provided mind mapping techniques as well.     Current symptoms:  Depression: denies.  Anxiety: excessive worrying and restlessness.  Sleep: non-restful sleep.  Sydni:  denies.  Psychosis: denies .  Therapeutic Intervention/Techniques: behavior modification, insight oriented, interactive, and supportive; relevant to diagnosis, patient responds to this modality    Risk Parameters:  Patient reports no suicidal ideation  Patient reports no homicidal ideation  Patient reports no self-injurious behavior  Patient reports no violent behavior    Diagnosis:   1. SEAN (generalized anxiety disorder)        2. Agoraphobia with panic attacks              Return to Clinic: as scheduled  Counseling time:  40  -Call to report any worsening of symptoms or problems associated with medication.  - Pt instructed to go to ER if thoughts of harming self or others arise.   -Supportive therapy and psychoeducation provided  -Pt instructed to call clinic, 911 or go to nearest emergency room if sxs worsen or pt is in crisis. The pt expresses understanding.   Each patient to whom he or she provides medical services by telemedicine is:  (1) informed of the relationship between the physician and patient and the respective role of any other health care provider with respect to management of the patient; and (2) notified that he or she may decline to receive medical services by telemedicine and may withdraw from such care at any time.

## 2025-06-03 ENCOUNTER — PATIENT MESSAGE (OUTPATIENT)
Dept: FAMILY MEDICINE | Facility: CLINIC | Age: 40
End: 2025-06-03
Payer: COMMERCIAL

## 2025-06-03 DIAGNOSIS — Z00.01 ANNUAL VISIT FOR GENERAL ADULT MEDICAL EXAMINATION WITH ABNORMAL FINDINGS: Primary | ICD-10-CM

## 2025-06-11 ENCOUNTER — LAB VISIT (OUTPATIENT)
Dept: LAB | Facility: HOSPITAL | Age: 40
End: 2025-06-11
Attending: FAMILY MEDICINE
Payer: COMMERCIAL

## 2025-06-11 DIAGNOSIS — Z00.01 ANNUAL VISIT FOR GENERAL ADULT MEDICAL EXAMINATION WITH ABNORMAL FINDINGS: ICD-10-CM

## 2025-06-11 LAB
ABSOLUTE EOSINOPHIL (OHS): 0.23 K/UL
ABSOLUTE MONOCYTE (OHS): 0.73 K/UL (ref 0.3–1)
ABSOLUTE NEUTROPHIL COUNT (OHS): 5.24 K/UL (ref 1.8–7.7)
ALBUMIN SERPL BCP-MCNC: 3.9 G/DL (ref 3.5–5.2)
ALP SERPL-CCNC: 79 UNIT/L (ref 40–150)
ALT SERPL W/O P-5'-P-CCNC: 31 UNIT/L (ref 10–44)
ANION GAP (OHS): 7 MMOL/L (ref 8–16)
AST SERPL-CCNC: 14 UNIT/L (ref 11–45)
BASOPHILS # BLD AUTO: 0.06 K/UL
BASOPHILS NFR BLD AUTO: 0.7 %
BILIRUB SERPL-MCNC: 0.6 MG/DL (ref 0.1–1)
BUN SERPL-MCNC: 13 MG/DL (ref 6–20)
CALCIUM SERPL-MCNC: 9 MG/DL (ref 8.7–10.5)
CHLORIDE SERPL-SCNC: 106 MMOL/L (ref 95–110)
CHOLEST SERPL-MCNC: 221 MG/DL (ref 120–199)
CHOLEST/HDLC SERPL: 6.9 {RATIO} (ref 2–5)
CO2 SERPL-SCNC: 27 MMOL/L (ref 23–29)
CREAT SERPL-MCNC: 1 MG/DL (ref 0.5–1.4)
ERYTHROCYTE [DISTWIDTH] IN BLOOD BY AUTOMATED COUNT: 12.6 % (ref 11.5–14.5)
GFR SERPLBLD CREATININE-BSD FMLA CKD-EPI: >60 ML/MIN/1.73/M2
GLUCOSE SERPL-MCNC: 108 MG/DL (ref 70–110)
HCT VFR BLD AUTO: 46.6 % (ref 40–54)
HDLC SERPL-MCNC: 32 MG/DL (ref 40–75)
HDLC SERPL: 14.5 % (ref 20–50)
HGB BLD-MCNC: 15.3 GM/DL (ref 14–18)
IMM GRANULOCYTES # BLD AUTO: 0.09 K/UL (ref 0–0.04)
IMM GRANULOCYTES NFR BLD AUTO: 1 % (ref 0–0.5)
LDLC SERPL CALC-MCNC: 139.4 MG/DL (ref 63–159)
LYMPHOCYTES # BLD AUTO: 2.63 K/UL (ref 1–4.8)
MCH RBC QN AUTO: 30 PG (ref 27–31)
MCHC RBC AUTO-ENTMCNC: 32.8 G/DL (ref 32–36)
MCV RBC AUTO: 91 FL (ref 82–98)
NONHDLC SERPL-MCNC: 189 MG/DL
NUCLEATED RBC (/100WBC) (OHS): 0 /100 WBC
PLATELET # BLD AUTO: 257 K/UL (ref 150–450)
PMV BLD AUTO: 9.8 FL (ref 9.2–12.9)
POTASSIUM SERPL-SCNC: 4.1 MMOL/L (ref 3.5–5.1)
PROT SERPL-MCNC: 7.1 GM/DL (ref 6–8.4)
RBC # BLD AUTO: 5.1 M/UL (ref 4.6–6.2)
RELATIVE EOSINOPHIL (OHS): 2.6 %
RELATIVE LYMPHOCYTE (OHS): 29.3 % (ref 18–48)
RELATIVE MONOCYTE (OHS): 8.1 % (ref 4–15)
RELATIVE NEUTROPHIL (OHS): 58.3 % (ref 38–73)
SODIUM SERPL-SCNC: 140 MMOL/L (ref 136–145)
TRIGL SERPL-MCNC: 248 MG/DL (ref 30–150)
TSH SERPL-ACNC: 1.27 UIU/ML (ref 0.4–4)
WBC # BLD AUTO: 8.98 K/UL (ref 3.9–12.7)

## 2025-06-11 PROCEDURE — 84155 ASSAY OF PROTEIN SERUM: CPT

## 2025-06-11 PROCEDURE — 85025 COMPLETE CBC W/AUTO DIFF WBC: CPT

## 2025-06-11 PROCEDURE — 84478 ASSAY OF TRIGLYCERIDES: CPT

## 2025-06-11 PROCEDURE — 36415 COLL VENOUS BLD VENIPUNCTURE: CPT | Mod: PO

## 2025-06-11 PROCEDURE — 84443 ASSAY THYROID STIM HORMONE: CPT

## 2025-08-01 ENCOUNTER — PATIENT MESSAGE (OUTPATIENT)
Dept: FAMILY MEDICINE | Facility: CLINIC | Age: 40
End: 2025-08-01
Payer: COMMERCIAL

## 2025-08-01 ENCOUNTER — OFFICE VISIT (OUTPATIENT)
Dept: FAMILY MEDICINE | Facility: CLINIC | Age: 40
End: 2025-08-01
Payer: COMMERCIAL

## 2025-08-01 VITALS
OXYGEN SATURATION: 95 % | HEIGHT: 71 IN | DIASTOLIC BLOOD PRESSURE: 76 MMHG | WEIGHT: 215.19 LBS | RESPIRATION RATE: 16 BRPM | BODY MASS INDEX: 30.13 KG/M2 | SYSTOLIC BLOOD PRESSURE: 122 MMHG | HEART RATE: 75 BPM | TEMPERATURE: 99 F

## 2025-08-01 DIAGNOSIS — E78.1 HYPERTRIGLYCERIDEMIA: ICD-10-CM

## 2025-08-01 DIAGNOSIS — F40.10 SOCIAL ANXIETY DISORDER: ICD-10-CM

## 2025-08-01 DIAGNOSIS — F41.9 ANXIETY: ICD-10-CM

## 2025-08-01 DIAGNOSIS — F51.01 PRIMARY INSOMNIA: ICD-10-CM

## 2025-08-01 DIAGNOSIS — Z23 IMMUNIZATION DUE: ICD-10-CM

## 2025-08-01 DIAGNOSIS — J30.2 SEASONAL ALLERGIC RHINITIS, UNSPECIFIED TRIGGER: ICD-10-CM

## 2025-08-01 DIAGNOSIS — Z00.00 WELLNESS EXAMINATION: Primary | ICD-10-CM

## 2025-08-01 DIAGNOSIS — Z13.6 ENCOUNTER FOR SCREENING FOR CARDIOVASCULAR DISORDERS: ICD-10-CM

## 2025-08-01 PROCEDURE — 99999 PR PBB SHADOW E&M-EST. PATIENT-LVL V: CPT | Mod: PBBFAC,,, | Performed by: FAMILY MEDICINE

## 2025-08-01 RX ORDER — EZETIMIBE 10 MG/1
10 TABLET ORAL DAILY
Qty: 90 TABLET | Refills: 3 | Status: SHIPPED | OUTPATIENT
Start: 2025-08-01 | End: 2026-08-01

## 2025-08-01 RX ORDER — FLUTICASONE PROPIONATE 50 MCG
2 SPRAY, SUSPENSION (ML) NASAL DAILY
COMMUNITY

## 2025-08-01 RX ORDER — PROPRANOLOL HYDROCHLORIDE 20 MG/1
20-40 TABLET ORAL 2 TIMES DAILY PRN
Qty: 90 TABLET | Refills: 11 | Status: SHIPPED | OUTPATIENT
Start: 2025-08-01 | End: 2026-08-01

## 2025-08-01 NOTE — PROGRESS NOTES
THIS DOCUMENT WAS MADE IN PART WITH VOICE RECOGNITION SOFTWARE.  OCCASIONALLY THIS SOFTWARE WILL MISINTERPRET WORDS OR PHRASES.    Assessment and Plan:    1. Wellness examination        2. Immunization due        3. Hypertriglyceridemia  CT Cardiac Scoring    ezetimibe (ZETIA) 10 mg tablet    Comprehensive Metabolic Panel    Lipid Panel      4. Encounter for screening for cardiovascular disorders  CT Cardiac Scoring      5. Primary insomnia        6. Anxiety        7. Seasonal allergic rhinitis, unspecified trigger        8. Social anxiety disorder  propranoloL (INDERAL) 20 MG tablet            Assessment & Plan    Z00.00 Wellness exam  Z23 Immunization due  E78.1 Hypertriglyceridemia  Z13.6 Encounter for screening for cardiovascular disorders  F51.01 Primary insomnia  F41.9 Anxiety  J30.2 Seasonal allergic rhinitis, unspecified trigger  F40.10 Social anxiety disorder    PLAN SUMMARY:  > Ordered cardiac calcium score  > Started Zetia (ezetimibe) to be taken with current atorvastatin regimen  > Continued atorvastatin at current dose  > Continued omega-3 supplements  > Started propranolol 1-2 tablets twice daily as needed for social anxiety  > Recommend elliptical, biking, or VR shadow boxing for cardiovascular exercise  > Daron to continue golfing for exercise  > Daron to improve dietary choices for cholesterol management  > Ordered fasting labs in 3 months  > Follow up in 3 months to review lab results and assess new medication regimen    WELLNESS EXAM:  > Follow up in 3 months to review lab results and assess effectiveness of new medication regimen.  > Contact office if experiencing any side effects from new medications.    HYPERTRIGLYCERIDEMIA:  > Evaluated current cholesterol management, finding suboptimal response to atorvastatin 10 mg.  > Considered family history of early-onset heart disease in treatment planning.  > Explained the potential long-term consequences of uncontrolled cholesterol, including erectile  dysfunction, strokes, heart attacks, and peripheral arterial disease.  > Daron to work on improving dietary choices to help manage cholesterol levels.  > Started Zetia (ezetimibe) to be taken with current atorvastatin regimen for better triglyceride control.  > Continued atorvastatin at current dose for cholesterol management.  > Continued omega-3 supplements.  > Discussed the importance of consistent medication use for cholesterol management.  > Ordered fasting labs in 3 months to reassess cholesterol levels after starting Zetia.    ENCOUNTER FOR SCREENING FOR CARDIOVASCULAR DISORDERS:  > Ordered cardiac calcium score.  > Educated on the benefits of cardiovascular exercise for overall health.  > Daron to continue golfing for exercise.  > Recommend trying elliptical or biking for cardiovascular exercise if tolerated by knees.  > Recommend exploring VR shadow boxing (e.g., [employer] with [employer] VR thad) as a low-impact cardio option.    ANXIETY:  > Assessed anxiety management, noting significant impact on social situations and relationships.    SOCIAL ANXIETY DISORDER:  > Started propranolol 1-2 tablets twice daily as needed for social anxiety and provided information on its mechanism of action for anxiety management.           Visit today included increased complexity associated with the care of the episodic problem above addressed and managing the longitudinal care of the patient due to the serious and/or complex managed problem(s) .        ______________________________________________________________________  Subjective:    Chief Complaint:  Chief Complaint   Patient presents with    Results     Requesting calcium score order be placed    Annual Exam    Anxiety     Increased anxiety would like to discuss        HPI:  Daron is a 40 y.o. year old       History of Present Illness    CHIEF COMPLAINT:  Daron presents today to establish care and discuss lab results.    ANXIETY AND MENTAL HEALTH:  He reports ongoing  "anxiety that significantly impacts social situations and personal enjoyment. He experiences heightened worry in social settings, becoming preoccupied with logistical concerns such as transportation and timing. This anxiety affects his interpersonal relationships, noting increased irritability towards his wife and family, describing himself as being "snappy" during interactions. While anxiety interferes with his ability to fully engage in activities, it does not significantly impact his work performance. He continues Buspirone 7.5 mg twice daily for anxiety management and last saw counselor Yamile Leblanc in February for mental health support. He has significant anxiety related to flying and claustrophobic experiences, particularly when unable to control exit routes. He takes Ativan as needed exclusively for flying anxiety. While anticipating flying causes distress, he feels more comfortable once on the plane.    SLEEP:  He has a history of insomnia with previous significant sleep disruption from intrusive thoughts. Sleep issues have improved with decreased anxiety-related sleep disturbances. He takes Lunesta 2 mg as needed, approximately once monthly. He currently denies persistent sleep interruptions related to worry or racing thoughts.    MUSCULOSKELETAL:  He has undergone five total knee surgeries, with two performed since May of last year. He experiences significant pain and mobility limitations with weight-bearing activities and prolonged walking. Walking without weight is comfortable, successfully completing 30,000 steps over 5 days during a recent [place] trip. Elliptical machines aggravate his knee condition, while walking and swimming are comfortable forms of exercise. He has gained approximately 10-15 lbs recently.    CARDIOVASCULAR:  He continues atorvastatin for cholesterol management over the past year, though cholesterol levels are not optimally controlled. He has significant family history of " "early heart disease, including a cousin who  of heart attack at age 40.    ALLERGIES AND MEDICATIONS:  He takes Loratadine 10 mg in the morning and Flonase nasal spray morning and evening for allergy management. Previously took both Claritin and Zyrtec but discontinued evening medication due to insomnia concerns approximately one year ago. Standard Flonase formulation is reported as effective.      ROS:  ROS as indicated in HPI.             Hypertriglyceridemia /hyperlipidemia  Rx-atorvastatin 10 mg, Co Q10, omega 3, zetia   EKG-2024-NSR   EKG stress-2023-negative    Anxiety/agoraphobia/claustrophobia  Rx-buspirone 7.5 mg b.i.d., Ativan 0.5 mg PRN (flying) + propranolol PRN    Counseling-MARY Leblanc (no follow-up)    Insomnia   Rx-Lunesta PRN ("one per month")     Seasonal allergies   Rx-loratadine, flonase        Past Medical History:  Past Medical History:   Diagnosis Date    Allergic rhinitis     History of ear infections     Hyperlipidemia        Past Surgical History:  Past Surgical History:   Procedure Laterality Date    ANTERIOR CRUCIATE LIGAMENT REPAIR Left     SHOULDER ARTHROSCOPY W/ LABRAL REPAIR Right     VASECTOMY         Family History:  Family History   Problem Relation Name Age of Onset    Breast cancer Mother      Colon cancer Neg Hx         Social History:  Social History     Socioeconomic History    Marital status:    Tobacco Use    Smoking status: Never     Passive exposure: Never    Smokeless tobacco: Never   Substance and Sexual Activity    Alcohol use: Yes     Comment: Occasional    Drug use: Never    Sexual activity: Yes     Partners: Female     Comment:      Social Drivers of Health     Financial Resource Strain: Low Risk  (2024)    Overall Financial Resource Strain (CARDIA)     Difficulty of Paying Living Expenses: Not very hard   Food Insecurity: No Food Insecurity (2024)    Hunger Vital Sign     Worried About Running Out of Food in the Last Year: Never true " "    Ran Out of Food in the Last Year: Never true   Transportation Needs: Unmet Transportation Needs (3/28/2024)    PRAPARE - Transportation     Lack of Transportation (Medical): Yes     Lack of Transportation (Non-Medical): No   Physical Activity: Insufficiently Active (6/5/2024)    Exercise Vital Sign     Days of Exercise per Week: 3 days     Minutes of Exercise per Session: 10 min   Stress: Stress Concern Present (6/5/2024)    [employer] - Occupational Stress Questionnaire     Feeling of Stress : Rather much   Housing Stability: Unknown (3/28/2024)    Housing Stability Vital Sign     Unable to Pay for Housing in the Last Year: No     Unstable Housing in the Last Year: No       Medications:  Medications Ordered Prior to Encounter[1]    Allergies:  Patient has no known allergies.    Immunizations:  Immunization History   Administered Date(s) Administered    COVID-19 MRNA, LN-S PF (MODERNA HALF 0.25 ML DOSE) 12/21/2021    COVID-19, MRNA, LN-S, PF (MODERNA FULL 0.5 ML DOSE) 02/02/2021, 02/02/2021, 03/16/2021, 03/16/2021    DTP 1985, 1985, 01/05/1986, 02/23/1987, 08/15/1990    Influenza 12/12/2020, 11/01/2022, 09/14/2023    Influenza - Quadrivalent - PF *Preferred* (6 months and older) 10/25/2018, 10/25/2018, 10/24/2019, 12/14/2020, 11/10/2021    Influenza - Trivalent - Fluarix, Flulaval, Fluzone, Afluria - PF 10/14/2014, 10/27/2022, 12/09/2024    MMR 02/23/1987, 08/15/1990, 07/31/2003    Meningococcal Polysaccharide (23-Valent) (MPSV4) 07/31/2003    OPV 1985, 1985, 01/15/1986, 08/15/1990    Td (ADULT) 07/31/2003    Tdap 10/24/2015       Review of Systems:  Review of Systems   All other systems reviewed and are negative.      Objective:    Vitals:  Vitals:    08/01/25 0835   BP: 122/76   Pulse: 75   Resp: 16   Temp: 98.5 °F (36.9 °C)   TempSrc: Oral   SpO2: 95%   Weight: 97.6 kg (215 lb 2.7 oz)   Height: 5' 11" (1.803 m)   PainSc: 0-No pain       Physical " Exam  Vitals reviewed.   Constitutional:       General: He is not in acute distress.  HENT:      Head: Normocephalic and atraumatic.   Eyes:      Pupils: Pupils are equal, round, and reactive to light.   Cardiovascular:      Rate and Rhythm: Normal rate and regular rhythm.      Heart sounds: No murmur heard.     No friction rub.   Pulmonary:      Effort: Pulmonary effort is normal.      Breath sounds: Normal breath sounds.   Abdominal:      General: Bowel sounds are normal. There is no distension.      Palpations: Abdomen is soft.      Tenderness: There is no abdominal tenderness.   Musculoskeletal:      Cervical back: Neck supple.   Skin:     General: Skin is warm and dry.      Findings: No rash.   Psychiatric:         Behavior: Behavior normal.             Oren Carpenter MD  Family Medicine             [1]   Current Outpatient Medications on File Prior to Visit   Medication Sig Dispense Refill    atorvastatin (LIPITOR) 10 MG tablet TAKE ONE TABLET BY MOUTH IN THE EVENING 90 tablet 1    busPIRone (BUSPAR) 7.5 MG tablet Take 1 tablet (7.5 mg total) by mouth 2 (two) times a day. 180 tablet 3    coenzyme Q10 200 mg capsule Take 200 mg by mouth once daily.      eszopiclone (LUNESTA) 2 MG Tab Take 1 tablet (2 mg total) by mouth nightly as needed (insomnia). 30 tablet 0    fluticasone propionate (FLONASE) 50 mcg/actuation nasal spray 2 sprays by Each Nostril route once daily.      loratadine (CLARITIN) 10 mg tablet Take 10 mg by mouth once daily.      LORazepam (ATIVAN) 0.5 MG tablet Take 1 tablet (0.5 mg total) by mouth daily as needed for Anxiety. 10 tablet 0     No current facility-administered medications on file prior to visit.

## 2025-08-05 ENCOUNTER — HOSPITAL ENCOUNTER (OUTPATIENT)
Dept: RADIOLOGY | Facility: HOSPITAL | Age: 40
Discharge: HOME | End: 2025-08-05
Attending: FAMILY MEDICINE
Payer: COMMERCIAL

## 2025-08-05 DIAGNOSIS — Z13.6 ENCOUNTER FOR SCREENING FOR CARDIOVASCULAR DISORDERS: ICD-10-CM

## 2025-08-05 DIAGNOSIS — E78.1 HYPERTRIGLYCERIDEMIA: ICD-10-CM

## 2025-08-05 PROCEDURE — 75571 CT HRT W/O DYE W/CA TEST: CPT | Mod: 26,,, | Performed by: STUDENT IN AN ORGANIZED HEALTH CARE EDUCATION/TRAINING PROGRAM

## 2025-08-05 PROCEDURE — 75571 CT HRT W/O DYE W/CA TEST: CPT | Mod: TC,PO

## 2025-08-06 ENCOUNTER — PATIENT MESSAGE (OUTPATIENT)
Dept: FAMILY MEDICINE | Facility: CLINIC | Age: 40
End: 2025-08-06
Payer: COMMERCIAL

## 2025-08-18 DIAGNOSIS — E78.2 MIXED HYPERLIPIDEMIA: ICD-10-CM

## 2025-08-18 RX ORDER — ATORVASTATIN CALCIUM 10 MG/1
10 TABLET, FILM COATED ORAL NIGHTLY
Qty: 90 TABLET | Refills: 1 | Status: SHIPPED | OUTPATIENT
Start: 2025-08-18